# Patient Record
Sex: FEMALE | Race: WHITE | NOT HISPANIC OR LATINO | Employment: FULL TIME | URBAN - METROPOLITAN AREA
[De-identification: names, ages, dates, MRNs, and addresses within clinical notes are randomized per-mention and may not be internally consistent; named-entity substitution may affect disease eponyms.]

---

## 2017-02-01 ENCOUNTER — TRANSFERRED RECORDS (OUTPATIENT)
Dept: HEALTH INFORMATION MANAGEMENT | Facility: CLINIC | Age: 27
End: 2017-02-01

## 2017-02-01 LAB — PAP SMEAR - HIM PATIENT REPORTED: NEGATIVE

## 2017-11-27 ENCOUNTER — OFFICE VISIT (OUTPATIENT)
Dept: FAMILY MEDICINE | Facility: CLINIC | Age: 27
End: 2017-11-27
Payer: COMMERCIAL

## 2017-11-27 VITALS
HEIGHT: 69 IN | HEART RATE: 78 BPM | WEIGHT: 200 LBS | DIASTOLIC BLOOD PRESSURE: 60 MMHG | BODY MASS INDEX: 29.62 KG/M2 | OXYGEN SATURATION: 100 % | RESPIRATION RATE: 16 BRPM | TEMPERATURE: 96.7 F | SYSTOLIC BLOOD PRESSURE: 100 MMHG

## 2017-11-27 DIAGNOSIS — Z00.00 ROUTINE GENERAL MEDICAL EXAMINATION AT A HEALTH CARE FACILITY: Primary | ICD-10-CM

## 2017-11-27 DIAGNOSIS — J30.2 CHRONIC SEASONAL ALLERGIC RHINITIS DUE TO OTHER ALLERGEN: ICD-10-CM

## 2017-11-27 DIAGNOSIS — Z12.4 SCREENING FOR CERVICAL CANCER: ICD-10-CM

## 2017-11-27 DIAGNOSIS — Z30.09 FAMILY PLANNING: ICD-10-CM

## 2017-11-27 LAB
CHOLEST SERPL-MCNC: 167 MG/DL
GLUCOSE SERPL-MCNC: 98 MG/DL (ref 70–99)
HDLC SERPL-MCNC: 76 MG/DL
LDLC SERPL CALC-MCNC: 81 MG/DL
NONHDLC SERPL-MCNC: 91 MG/DL
TRIGL SERPL-MCNC: 50 MG/DL

## 2017-11-27 PROCEDURE — 99395 PREV VISIT EST AGE 18-39: CPT | Performed by: FAMILY MEDICINE

## 2017-11-27 PROCEDURE — 82947 ASSAY GLUCOSE BLOOD QUANT: CPT | Performed by: FAMILY MEDICINE

## 2017-11-27 PROCEDURE — 80061 LIPID PANEL: CPT | Performed by: FAMILY MEDICINE

## 2017-11-27 PROCEDURE — 36415 COLL VENOUS BLD VENIPUNCTURE: CPT | Performed by: FAMILY MEDICINE

## 2017-11-27 RX ORDER — LEVONORGESTREL / ETHINYL ESTRADIOL 0.15-0.03
1 KIT ORAL DAILY
Qty: 91 TABLET | Refills: 3 | Status: CANCELLED | OUTPATIENT
Start: 2017-11-27

## 2017-11-27 NOTE — PATIENT INSTRUCTIONS
[employer] [facility] [employer] phone number:    [phone]    Preventive Health Recommendations  Female Ages 26 - 39  Yearly exam:   See your health care provider every year in order to    Review health changes.     Discuss preventive care.      Review your medicines if you your doctor has prescribed any.    Until age 30: Get a Pap test every three years (more often if you have had an abnormal result).    After age 30: Talk to your doctor about whether you should have a Pap test every 3 years or have a Pap test with HPV screening every 5 years.   You do not need a Pap test if your uterus was removed (hysterectomy) and you have not had cancer.  You should be tested each year for STDs (sexually transmitted diseases), if you're at risk.   Talk to your provider about how often to have your cholesterol checked.  If you are at risk for diabetes, you should have a diabetes test (fasting glucose).  Shots: Get a flu shot each year. Get a tetanus shot every 10 years.   Nutrition:     Eat at least 5 servings of fruits and vegetables each day.    Eat whole-grain bread, whole-wheat pasta and brown rice instead of white grains and rice.    Talk to your provider about Calcium and Vitamin D.     Lifestyle    Exercise at least 150 minutes a week (30 minutes a day, 5 days of the week). This will help you control your weight and prevent disease.    Limit alcohol to one drink per day.    No smoking.     Wear sunscreen to prevent skin cancer.    See your dentist every six months for an exam and cleaning.

## 2017-11-27 NOTE — NURSING NOTE
"Chief Complaint   Patient presents with     Physical     initial /60 (BP Location: Left arm, Cuff Size: Adult Regular)  Pulse 78  Temp 96.7  F (35.9  C) (Oral)  Resp 16  Ht 5' 8.5\" (1.74 m)  Wt 200 lb (90.7 kg)  LMP 11/15/2017  SpO2 100%  BMI 29.97 kg/m2 Estimated body mass index is 29.97 kg/(m^2) as calculated from the following:    Height as of this encounter: 5' 8.5\" (1.74 m).    Weight as of this encounter: 200 lb (90.7 kg).  BP completed using cuff size: regular.  L  R arm      Health Maintenance that is potentially due pending provider review:  Pap done with ob/gyn    n/a    Ancelmo Santos ma  "
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
## 2017-11-27 NOTE — MR AVS SNAPSHOT
After Visit Summary   11/27/2017    Courtney Clarke    MRN: [id number]           Patient Information     Date Of Birth          1990        Visit Information        Provider Department      11/27/2017 8:30 AM Jenny Landrum MD [facility]        Today's Diagnoses     Routine general medical examination at a health care facility    -  1    Family planning        Screening for cervical cancer        Chronic seasonal allergic rhinitis due to other allergen        BMI 31.0-31.9,adult          Care Instructions    [employer] [facility] [employer] phone number:    [phone]    Preventive Health Recommendations  Female Ages 26 - 39  Yearly exam:   See your health care provider every year in order to    Review health changes.     Discuss preventive care.      Review your medicines if you your doctor has prescribed any.    Until age 30: Get a Pap test every three years (more often if you have had an abnormal result).    After age 30: Talk to your doctor about whether you should have a Pap test every 3 years or have a Pap test with HPV screening every 5 years.   You do not need a Pap test if your uterus was removed (hysterectomy) and you have not had cancer.  You should be tested each year for STDs (sexually transmitted diseases), if you're at risk.   Talk to your provider about how often to have your cholesterol checked.  If you are at risk for diabetes, you should have a diabetes test (fasting glucose).  Shots: Get a flu shot each year. Get a tetanus shot every 10 years.   Nutrition:     Eat at least 5 servings of fruits and vegetables each day.    Eat whole-grain bread, whole-wheat pasta and brown rice instead of white grains and rice.    Talk to your provider about Calcium and Vitamin D.     Lifestyle    Exercise at least 150 minutes a week (30 minutes a day, 5 days of the week). This will help you control your weight and prevent disease.    Limit alcohol to one drink per day.    No  "smoking.     Wear sunscreen to prevent skin cancer.    See your dentist every six months for an exam and cleaning.            Follow-ups after your visit        Follow-up notes from your care team     Return in about 1 year (around 11/27/2018).      Who to contact     If you have questions or need follow up information about today's clinic visit or your schedule please contact [facility] directly at [phone].  Normal or non-critical lab and imaging results will be communicated to you by Ge[employer], letter or phone within 4 business days after the clinic has received the results. If you do not hear from us within 7 days, please contact the clinic through "[employer] or phone. If you have a critical or abnormal lab result, we will notify you by phone as soon as possible.  Submit refill requests through "[employer] or call your pharmacy and they will forward the refill request to us. Please allow 3 business days for your refill to be completed.          Additional Information About Your Visit        [email or web address] Information     "[employer] gives you secure access to your electronic health record. If you see a primary care provider, you can also send messages to your care team and make appointments. If you have questions, please call your primary care clinic.  If you do not have a primary care provider, please call [phone] and they will assist you.        Care EveryWhere ID     This is your Care EveryWhere ID. This could be used by other organizations to access your [place] medical records  [id number]        Your Vitals Were     Pulse Temperature Respirations Height Last Period Pulse Oximetry    78 96.7  F (35.9  C) (Oral) 16 5' 8.5\" (1.74 m) 11/15/2017 100%    BMI (Body Mass Index)                   29.97 kg/m2            Blood Pressure from Last 3 Encounters:   11/27/17 100/60   11/21/16 108/68   11/20/15 110/68    Weight from Last 3 Encounters:   11/27/17 200 lb (90.7 kg)   11/21/16 203 lb 11.2 oz (92.4 kg) "   11/20/15 205 lb 12.8 oz (93.4 kg)              We Performed the Following     Glucose     Lipid panel reflex to direct LDL Fasting        Primary Care Provider Office Phone # Fax #    Jenny Landrum [phone] [address] [facility] [address]        Equal Access to Services     [facility]: Hadii aad ku hadasho Soomaali, waaxda luqadaha, qaybta kaalmada adeegyada, waxay idiin hayaan adeeg kharash la'aan . So [facility] [phone].    ATENCIÓN: Si habla español, tiene a faulkner disposición servicios gratuitos de asistencia lingüística. Llame al [phone].    We comply with applicable federal civil rights laws and [place] laws. We do not discriminate on the basis of race, color, national origin, age, disability, sex, sexual orientation, or gender identity.            Thank you!     Thank you for choosing [facility]  for your care. Our goal is always to provide you with excellent care. Hearing back from our patients is one way we can continue to improve our services. Please take a few minutes to complete the written survey that you may receive in the mail after your visit with us. Thank you!             Your Updated Medication List - Protect others around you: Learn how to safely use, store and throw away your medicines at www.disposemymeds.org.          This list is accurate as of: 11/27/17  8:47 AM.  Always use your most recent med list.                   Brand Name Dispense Instructions for use Diagnosis    cetirizine 10 MG tablet    zyrTEC     Take 10 mg by mouth daily

## 2017-11-28 NOTE — PROGRESS NOTES
-Cholesterol levels (LDL,HDL, Triglycerides) are normal.  ADVISE: rechecking in 1 year.  -Glucose (diabetic screening test) is normal.    Please keep us posted with questions or concerns .      Best Regards,    Jenny Landrum MD  [facility]  [phone]

## 2018-11-29 ENCOUNTER — OFFICE VISIT (OUTPATIENT)
Dept: FAMILY MEDICINE | Facility: CLINIC | Age: 28
End: 2018-11-29
Payer: COMMERCIAL

## 2018-11-29 ENCOUNTER — TELEPHONE (OUTPATIENT)
Dept: FAMILY MEDICINE | Facility: CLINIC | Age: 28
End: 2018-11-29

## 2018-11-29 VITALS
OXYGEN SATURATION: 100 % | HEART RATE: 64 BPM | DIASTOLIC BLOOD PRESSURE: 72 MMHG | WEIGHT: 203.6 LBS | TEMPERATURE: 98.6 F | HEIGHT: 69 IN | BODY MASS INDEX: 30.16 KG/M2 | SYSTOLIC BLOOD PRESSURE: 110 MMHG

## 2018-11-29 DIAGNOSIS — Z11.4 SCREENING FOR HIV (HUMAN IMMUNODEFICIENCY VIRUS): ICD-10-CM

## 2018-11-29 DIAGNOSIS — Z30.41 ENCOUNTER FOR SURVEILLANCE OF CONTRACEPTIVE PILLS: ICD-10-CM

## 2018-11-29 DIAGNOSIS — Z00.00 PREVENTATIVE HEALTH CARE: Primary | ICD-10-CM

## 2018-11-29 LAB
CHOLEST SERPL-MCNC: 152 MG/DL
GLUCOSE SERPL-MCNC: 94 MG/DL (ref 70–99)
HDLC SERPL-MCNC: 62 MG/DL
HIV 1+2 AB+HIV1 P24 AG SERPL QL IA: NONREACTIVE
LDLC SERPL CALC-MCNC: 77 MG/DL
NONHDLC SERPL-MCNC: 90 MG/DL
TRIGL SERPL-MCNC: 63 MG/DL

## 2018-11-29 PROCEDURE — 80061 LIPID PANEL: CPT | Performed by: FAMILY MEDICINE

## 2018-11-29 PROCEDURE — 36415 COLL VENOUS BLD VENIPUNCTURE: CPT | Performed by: FAMILY MEDICINE

## 2018-11-29 PROCEDURE — 82947 ASSAY GLUCOSE BLOOD QUANT: CPT | Performed by: FAMILY MEDICINE

## 2018-11-29 PROCEDURE — 99395 PREV VISIT EST AGE 18-39: CPT | Performed by: FAMILY MEDICINE

## 2018-11-29 PROCEDURE — 87389 HIV-1 AG W/HIV-1&-2 AB AG IA: CPT | Performed by: FAMILY MEDICINE

## 2018-11-29 RX ORDER — LEVONORGESTREL AND ETHINYL ESTRADIOL 0.15-0.03
KIT ORAL
Refills: 1 | COMMUNITY
Start: 2018-11-10 | End: 2020-01-24

## 2018-11-29 NOTE — MR AVS SNAPSHOT
After Visit Summary   11/29/2018    Courtnye Clarke    MRN: [id number]           Patient Information     Date Of Birth          1990        Visit Information        Provider Department      11/29/2018 7:40 AM Tahir Toussaint MD [facility]        Today's Diagnoses     Preventative health care    -  1      Care Instructions      Preventive Health Recommendations  Female Ages 26 - 39  Yearly exam:   See your health care provider every year in order to    Review health changes.     Discuss preventive care.      Review your medicines if you your doctor has prescribed any.    Until age 30: Get a Pap test every three years (more often if you have had an abnormal result).    After age 30: Talk to your doctor about whether you should have a Pap test every 3 years or have a Pap test with HPV screening every 5 years.   You do not need a Pap test if your uterus was removed (hysterectomy) and you have not had cancer.  You should be tested each year for STDs (sexually transmitted diseases), if you're at risk.   Talk to your provider about how often to have your cholesterol checked.  If you are at risk for diabetes, you should have a diabetes test (fasting glucose).  Shots: Get a flu shot each year. Get a tetanus shot every 10 years.   Nutrition:     Eat at least 5 servings of fruits and vegetables each day.    Eat whole-grain bread, whole-wheat pasta and brown rice instead of white grains and rice.    Get adequate Calcium and Vitamin D.     Lifestyle    Exercise at least 150 minutes a week (30 minutes a day, 5 days of the week). This will help you control your weight and prevent disease.    Limit alcohol to one drink per day.    No smoking.     Wear sunscreen to prevent skin cancer.    See your dentist every six months for an exam and cleaning.            Follow-ups after your visit        Follow-up notes from your care team     Return in about 1 year (around 11/29/2019).      Who to contact   "   If you have questions or need follow up information about today's clinic visit or your schedule please contact [facility] directly at [phone].  Normal or non-critical lab and imaging results will be communicated to you by MyChart, letter or phone within 4 business days after the clinic has received the results. If you do not hear from us within 7 days, please contact the clinic through [employer] or phone. If you have a critical or abnormal lab result, we will notify you by phone as soon as possible.  Submit refill requests through [employer] or call your pharmacy and they will forward the refill request to us. Please allow 3 business days for your refill to be completed.          Additional Information About Your Visit        [employer] Information     [employer] gives you secure access to your electronic health record. If you see a primary care provider, you can also send messages to your care team and make appointments. If you have questions, please call your primary care clinic.  If you do not have a primary care provider, please call [phone] and they will assist you.        Care EveryWhere ID     This is your Care EveryWhere ID. This could be used by other organizations to access your [place] medical records  [id number]        Your Vitals Were     Pulse Temperature Height Last Period Pulse Oximetry BMI (Body Mass Index)    64 98.6  F (37  C) (Tympanic) 5' 8.5\" (1.74 m) 11/27/2018 100% 30.51 kg/m2       Blood Pressure from Last 3 Encounters:   11/29/18 110/72   11/27/17 100/60   11/21/16 108/68    Weight from Last 3 Encounters:   11/29/18 203 lb 9.6 oz (92.4 kg)   11/27/17 200 lb (90.7 kg)   11/21/16 203 lb 11.2 oz (92.4 kg)              We Performed the Following     Glucose     HIV Antigen Antibody Combo     Lipid panel reflex to direct LDL Fasting        Primary Care Provider Office Phone # Fax #    Jenny Landrum [phone] [address] [facility] " [id number]        Equal Access to Services     [facility]: Hadii aad ku hadmansimelba Cristelaali, wabinduwesley piperzahraaha, qaulyssesalfredo zhenglindasheba austin. So [facility] [phone].    ATENCIÓN: Si habla español, tiene a faulkner disposición servicios gratuitos de asistencia lingüística. Llame al [phone].    We comply with applicable federal civil rights laws and [place] laws. We do not discriminate on the basis of race, color, national origin, age, disability, sex, sexual orientation, or gender identity.            Thank you!     Thank you for choosing [facility]  for your care. Our goal is always to provide you with excellent care. Hearing back from our patients is one way we can continue to improve our services. Please take a few minutes to complete the written survey that you may receive in the mail after your visit with us. Thank you!             Your Updated Medication List - Protect others around you: Learn how to safely use, store and throw away your medicines at www.disposemymeds.org.          This list is accurate as of 11/29/18  8:12 AM.  Always use your most recent med list.                   Brand Name Dispense Instructions for use Diagnosis    cetirizine 10 MG tablet    zyrTEC     Take 10 mg by mouth daily        INTROVALE 0.15-0.03 MG tablet   Generic drug:  levonorgestrel-ethinyl estradiol       [facility] health care

## 2018-11-29 NOTE — PROGRESS NOTES
SUBJECTIVE:   CC: Courtney Clarke is an 28 year old woman who presents for preventive health visit.     Physical   Annual:     Getting at least 3 servings of Calcium per day:  Yes    Bi-annual eye exam:  Yes    Dental care twice a year:  Yes    Sleep apnea or symptoms of sleep apnea:  None    Diet:  Regular (no restrictions)    Frequency of exercise:  2-3 days/week    Duration of exercise:  45-60 minutes    Taking medications regularly:  Yes    Medication side effects:  Not applicable    Additional concerns today:  No    PHQ-2 Total Score: 0      Denies any concerns.   Using an OCP for contraception.     Today's PHQ-2 Score:   PHQ-2 ( 1999 Pfizer) 11/29/2018   Q1: Little interest or pleasure in doing things 0   Q2: Feeling down, depressed or hopeless 0   PHQ-2 Score 0   Q1: Little interest or pleasure in doing things Not at all   Q2: Feeling down, depressed or hopeless Not at all   PHQ-2 Score 0       Abuse: Current or Past(Physical, Sexual or Emotional)- NO  Do you feel safe in your environment? YES    Social History   Substance Use Topics     Smoking status: Never Smoker     Smokeless tobacco: Never Used     Alcohol use 0.0 oz/week     0 Standard drinks or equivalent per week      Comment: 1-2 a few times per week     Alcohol Use 11/29/2018   If you drink alcohol do you typically have greater than 3 drinks per day OR greater than 7 drinks per week? No   No flowsheet data found.    Reviewed orders with patient.  Reviewed health maintenance and updated orders accordingly - Yes  labs reviewed in EPIC    Mammogram not appropriate for this patient based on age.    Pertinent mammograms are reviewed under the imaging tab.  History of abnormal Pap smear: NO - age 30-65 PAP every 5 years with negative HPV co-testing recommended     Reviewed and updated as needed this visit by clinical staff  Tobacco  Allergies  Meds  Med Hx  Surg Hx  Fam Hx  Soc Hx        Reviewed and updated as needed this visit by Provider       "      Review of Systems  CONSTITUTIONAL: NEGATIVE for fever, chills, change in weight  INTEGUMENTARU/SKIN: NEGATIVE for worrisome rashes, moles or lesions  EYES: NEGATIVE for vision changes or irritation  ENT: NEGATIVE for ear, mouth and throat problems  RESP: NEGATIVE for significant cough or SOB  BREAST: NEGATIVE for masses, tenderness or discharge  CV: NEGATIVE for chest pain, palpitations or peripheral edema  GI: NEGATIVE for nausea, abdominal pain, heartburn, or change in bowel habits  : NEGATIVE for unusual urinary or vaginal symptoms. Periods are regular.  MUSCULOSKELETAL: NEGATIVE for significant arthralgias or myalgia  NEURO: NEGATIVE for weakness, dizziness or paresthesias  PSYCHIATRIC: NEGATIVE for changes in mood or affect     OBJECTIVE:   /72  Pulse 64  Temp 98.6  F (37  C) (Tympanic)  Ht 5' 8.5\" (1.74 m)  Wt 203 lb 9.6 oz (92.4 kg)  LMP 11/27/2018  SpO2 100%  BMI 30.51 kg/m2  Physical Exam  GENERAL: healthy, alert and no distress  EYES: Eyes grossly normal to inspection, PERRL and conjunctivae and sclerae normal  HENT: ear canals and TM's normal, nose and mouth without ulcers or lesions  NECK: no adenopathy, no asymmetry, masses, or scars and thyroid normal to palpation  RESP: lungs clear to auscultation - no rales, rhonchi or wheezes  BREAST: normal without masses, tenderness or nipple discharge and no palpable axillary masses or adenopathy  CV: regular rate and rhythm, normal S1 S2, no S3 or S4, no murmur, click or rub, no peripheral edema and peripheral pulses strong  ABDOMEN: soft, nontender, no hepatosplenomegaly, no masses and bowel sounds normal  MS: no gross musculoskeletal defects noted, no edema  SKIN: no suspicious lesions or rashes  NEURO: Normal strength and tone, mentation intact and speech normal  PSYCH: mentation appears normal, affect normal/bright    Diagnostic Test Results:  No results found for this or any previous visit (from the past 24 hour(s)).    ASSESSMENT/PLAN: " "      ICD-10-CM    1. Preventative health care Z00.00 Lipid panel reflex to direct LDL Fasting     Glucose   2. Encounter for surveillance of contraceptive pills Z30.41 INTROVALE 0.15-0.03 MG tablet   3. Screening for HIV (human immunodeficiency virus) Z11.4 HIV Antigen Antibody Combo       COUNSELING:  Reviewed preventive health counseling, as reflected in patient instructions       Regular exercise       Healthy diet/nutrition    BP Readings from Last 1 Encounters:   11/29/18 110/72     Estimated body mass index is 30.51 kg/(m^2) as calculated from the following:    Height as of this encounter: 5' 8.5\" (1.74 m).    Weight as of this encounter: 203 lb 9.6 oz (92.4 kg).    Counseling Resources:  ATP IV Guidelines  Pooled Cohorts Equation Calculator  Breast Cancer Risk Calculator  FRAX Risk Assessment  ICSI Preventive Guidelines  Dietary Guidelines for Americans, 2010  USDA's MyPlate  ASA Prophylaxis  Lung CA Screening    Tahir Toussaint MD  [facility]  "

## 2018-11-29 NOTE — NURSING NOTE
"Chief Complaint   Patient presents with     Physical     pt is fasting, has HEALTH screening form     /72  Pulse 64  Temp 98.6  F (37  C) (Tympanic)  Ht 5' 8.5\" (1.74 m)  Wt 203 lb 9.6 oz (92.4 kg)  LMP 11/27/2018  SpO2 100%  BMI 30.51 kg/m2 Estimated body mass index is 30.51 kg/(m^2) as calculated from the following:    Height as of this encounter: 5' 8.5\" (1.74 m).    Weight as of this encounter: 203 lb 9.6 oz (92.4 kg).        Health Maintenance due pending provider review:  NONE    n/a    Shannon Aden CMA  "

## 2018-12-06 NOTE — PROGRESS NOTES
Dear Courtney,   Here are your recent results.  All results are within normal ranges.    Tahir Toussaint MD

## 2019-02-01 ENCOUNTER — TRANSFERRED RECORDS (OUTPATIENT)
Dept: HEALTH INFORMATION MANAGEMENT | Facility: CLINIC | Age: 29
End: 2019-02-01

## 2019-02-01 LAB — PAP SMEAR - HIM PATIENT REPORTED: NEGATIVE

## 2019-12-09 ENCOUNTER — HEALTH MAINTENANCE LETTER (OUTPATIENT)
Age: 29
End: 2019-12-09

## 2020-01-24 ENCOUNTER — OFFICE VISIT (OUTPATIENT)
Dept: FAMILY MEDICINE | Facility: CLINIC | Age: 30
End: 2020-01-24
Payer: COMMERCIAL

## 2020-01-24 VITALS
OXYGEN SATURATION: 99 % | HEIGHT: 68 IN | HEART RATE: 69 BPM | DIASTOLIC BLOOD PRESSURE: 66 MMHG | TEMPERATURE: 98.5 F | RESPIRATION RATE: 16 BRPM | SYSTOLIC BLOOD PRESSURE: 95 MMHG | BODY MASS INDEX: 29.7 KG/M2 | WEIGHT: 196 LBS

## 2020-01-24 DIAGNOSIS — Z00.00 ROUTINE GENERAL MEDICAL EXAMINATION AT A HEALTH CARE FACILITY: Primary | ICD-10-CM

## 2020-01-24 PROCEDURE — 99395 PREV VISIT EST AGE 18-39: CPT | Performed by: FAMILY MEDICINE

## 2020-01-24 ASSESSMENT — MIFFLIN-ST. JEOR: SCORE: 1652.55

## 2020-01-24 NOTE — PROGRESS NOTES
SUBJECTIVE:   CC: Courtney Clarke is an 29 year old woman who presents for preventive health visit.     Healthy Habits:     Getting at least 3 servings of Calcium per day:  Yes    Bi-annual eye exam:  Yes    Dental care twice a year:  Yes    Sleep apnea or symptoms of sleep apnea:  None    Diet:  Regular (no restrictions)    Frequency of exercise:  4-5 days/week    Duration of exercise:  30-45 minutes    Taking medications regularly:  Yes    Medication side effects:  Not applicable    PHQ-2 Total Score: 0    Additional concerns today:  No    Pap done at [facility]. Last pap was 3 yrs ago and it was normal. Due for a pap in 02/2022    Today's PHQ-2 Score:   PHQ-2 ( 1999 [employer]) 1/24/2020   Q1: Little interest or pleasure in doing things 0   Q2: Feeling down, depressed or hopeless 0   PHQ-2 Score 0   Q1: Little interest or pleasure in doing things Not at all   Q2: Feeling down, depressed or hopeless Not at all   PHQ-2 Score 0       Abuse: Current or Past(Physical, Sexual or Emotional)- No  Do you feel safe in your environment? Yes        Social History     Tobacco Use     Smoking status: Never Smoker     Smokeless tobacco: Never Used   Substance Use Topics     Alcohol use: Yes     Alcohol/week: 0.0 standard drinks     Comment: 1-2 a few times per week     If you drink alcohol do you typically have >3 drinks per day or >7 drinks per week? No    Alcohol Use 1/24/2020   Prescreen: >3 drinks/day or >7 drinks/week? No   Prescreen: >3 drinks/day or >7 drinks/week? -   No flowsheet data found.    Reviewed orders with patient.  Reviewed health maintenance and updated orders accordingly - Yes  Lab work is in process    Mammogram not appropriate for this patient based on age.    Pertinent mammograms are reviewed under the imaging tab.  History of abnormal Pap smear: NO - age 30- 65 PAP every 3 years recommended     Reviewed and updated as needed this visit by clinical staff  Tobacco  Allergies  Meds  Med Hx   "Surg Hx  Fam Hx  Soc Hx        Reviewed and updated as needed this visit by Provider            Review of Systems  CONSTITUTIONAL: NEGATIVE for fever, chills, change in weight  INTEGUMENTARU/SKIN: NEGATIVE for worrisome rashes, moles or lesions  EYES: NEGATIVE for vision changes or irritation  ENT: NEGATIVE for ear, mouth and throat problems  RESP: NEGATIVE for significant cough or SOB  BREAST: NEGATIVE for masses, tenderness or discharge  CV: NEGATIVE for chest pain, palpitations or peripheral edema  GI: NEGATIVE for nausea, abdominal pain, heartburn, or change in bowel habits  : NEGATIVE for unusual urinary or vaginal symptoms. Periods are regular.  MUSCULOSKELETAL: NEGATIVE for significant arthralgias or myalgia  NEURO: NEGATIVE for weakness, dizziness or paresthesias  PSYCHIATRIC: NEGATIVE for changes in mood or affect     OBJECTIVE:   BP 95/66   Pulse 69   Temp 98.5  F (36.9  C) (Oral)   Resp 16   Ht 1.727 m (5' 8\")   Wt 88.9 kg (196 lb)   LMP 01/11/2020   SpO2 99%   BMI 29.80 kg/m    Physical Exam  GENERAL: healthy, alert and no distress  EYES: Eyes grossly normal to inspection, PERRL and conjunctivae and sclerae normal  HENT: ear canals and TM's normal, nose and mouth without ulcers or lesions  NECK: no adenopathy, no asymmetry, masses, or scars and thyroid normal to palpation  RESP: lungs clear to auscultation - no rales, rhonchi or wheezes  BREAST: normal without masses, tenderness or nipple discharge and no palpable axillary masses or adenopathy  CV: regular rate and rhythm, normal S1 S2, no S3 or S4, no murmur, click or rub, no peripheral edema and peripheral pulses strong  ABDOMEN: soft, nontender, no hepatosplenomegaly, no masses and bowel sounds normal  MS: no gross musculoskeletal defects noted, no edema  SKIN: no suspicious lesions or rashes  NEURO: Normal strength and tone, mentation intact and speech normal  PSYCH: mentation appears normal, affect normal/bright    Diagnostic Test " "Results:  Labs reviewed in Epic  No results found for this or any previous visit (from the past 24 hour(s)).    ASSESSMENT/PLAN:       ICD-10-CM    1. Routine general medical examination at a health care facility Z00.00        COUNSELING:  Reviewed preventive health counseling, as reflected in patient instructions       Regular exercise       Healthy diet/nutrition       Vision screening       Hearing screening    Estimated body mass index is 29.8 kg/m  as calculated from the following:    Height as of this encounter: 1.727 m (5' 8\").    Weight as of this encounter: 88.9 kg (196 lb).    Counseling Resources:  ATP IV Guidelines  Pooled Cohorts Equation Calculator  Breast Cancer Risk Calculator  FRAX Risk Assessment  ICSI Preventive Guidelines  Dietary Guidelines for Americans, 2010  USDA's MyPlate  ASA Prophylaxis  Lung CA Screening    Tahir Toussaint MD  [facility]  "

## 2020-04-02 LAB
ABO + RH BLD: NORMAL
ABO + RH BLD: NORMAL
BLD GP AB SCN SERPL QL: NEGATIVE
HBV SURFACE AG SERPL QL IA: NEGATIVE
HIV 1+2 AB+HIV1 P24 AG SERPL QL IA: NORMAL
RUBELLA ABY IGG: NORMAL
TREPONEMA ANTIBODIES: NORMAL

## 2020-10-14 ENCOUNTER — TELEPHONE (OUTPATIENT)
Dept: FAMILY MEDICINE | Facility: CLINIC | Age: 30
End: 2020-10-14

## 2020-10-16 LAB — GROUP B STREP PCR: NEGATIVE

## 2020-10-20 ENCOUNTER — HOSPITAL ENCOUNTER (OUTPATIENT)
Facility: CLINIC | Age: 30
Discharge: HOME | End: 2020-10-20
Attending: OBSTETRICS & GYNECOLOGY | Admitting: OBSTETRICS & GYNECOLOGY
Payer: COMMERCIAL

## 2020-10-20 VITALS — DIASTOLIC BLOOD PRESSURE: 71 MMHG | RESPIRATION RATE: 18 BRPM | TEMPERATURE: 98.4 F | SYSTOLIC BLOOD PRESSURE: 120 MMHG

## 2020-10-20 PROBLEM — O26.90 PREGNANCY RELATED CONDITION: Status: ACTIVE | Noted: 2020-10-20

## 2020-10-20 LAB — RUPTURE OF FETAL MEMBRANES BY ROM PLUS: NEGATIVE

## 2020-10-20 PROCEDURE — 59025 FETAL NON-STRESS TEST: CPT

## 2020-10-20 PROCEDURE — 84112 EVAL AMNIOTIC FLUID PROTEIN: CPT | Performed by: OBSTETRICS & GYNECOLOGY

## 2020-10-20 PROCEDURE — G0463 HOSPITAL OUTPT CLINIC VISIT: HCPCS

## 2020-10-20 RX ORDER — PRENATAL VIT/IRON FUM/FOLIC AC 27MG-0.8MG
1 TABLET ORAL DAILY
COMMUNITY

## 2020-10-20 RX ORDER — ONDANSETRON 2 MG/ML
4 INJECTION INTRAMUSCULAR; INTRAVENOUS EVERY 6 HOURS PRN
Status: DISCONTINUED | OUTPATIENT
Start: 2020-10-20 | End: 2020-10-20 | Stop reason: HOSPADM

## 2020-10-20 NOTE — PLAN OF CARE
Primip admitted to MAC, ambulatory per services of Dr. Schwab for evaluation of PROM.  Pt states she has noticed leaking of watery discharge since yesterday.  Reports good fetal movement, denies any contractions, no bleeding.  Discussed plan of care including EFM with NST, routine VS, ROM+  and possible SVE.  Pt agreeable.  EFM applied and admission assessment completed.

## 2020-10-20 NOTE — DISCHARGE INSTRUCTIONS
Discharge Instruction for Undelivered Patients      You were seen for: Membrane Assessment  We Consulted: Dr. Schwab  You had (Test or Medicine):External fetal monitoring with non-stress test, routine vital signs and a ROM+ test     Diet:   Drink 8 to 12 glasses of liquids (milk, juice, water) every day.  You may eat meals and snacks.     Activity:  Call your doctor or nurse midwife if your baby is moving less than usual.     Call your provider if you notice:  Swelling in your face or increased swelling in your hands or legs.  Headaches that are not relieved by Tylenol (acetaminophen).  Changes in your vision (blurring: seeing spots or stars.)  Nausea (sick to your stomach) and vomiting (throwing up).   Weight gain of 5 pounds or more per week.  Heartburn that doesn't go away.  Signs of bladder infection: pain when you urinate (use the toilet), need to go more often and more urgently.  The bag of bhatti (rupture of membranes) breaks, or you notice leaking in your underwear.  Bright red blood in your underwear.  Abdominal (lower belly) or stomach pain.  For first baby: Contractions (tightening) less than 5 minutes apart for one hour or more.  Increase or change in vaginal discharge (note the color and amount)  Other: Call for any questions or concerns    Follow-up:  As scheduled in the clinic

## 2020-10-20 NOTE — PLAN OF CARE
1715- Dr. Schwab updated. Plan discharge to home with labor precautions.  All discharge teaching completed with patient.

## 2020-10-24 ENCOUNTER — ANESTHESIA EVENT (OUTPATIENT)
Dept: OBGYN | Facility: CLINIC | Age: 30
End: 2020-10-24
Payer: COMMERCIAL

## 2020-10-24 ENCOUNTER — HOSPITAL ENCOUNTER (INPATIENT)
Facility: CLINIC | Age: 30
LOS: 2 days | Discharge: HOME | End: 2020-10-26
Attending: OBSTETRICS & GYNECOLOGY | Admitting: OBSTETRICS & GYNECOLOGY
Payer: COMMERCIAL

## 2020-10-24 ENCOUNTER — ANESTHESIA (OUTPATIENT)
Dept: OBGYN | Facility: CLINIC | Age: 30
End: 2020-10-24
Payer: COMMERCIAL

## 2020-10-24 LAB
ABO + RH BLD: NORMAL
ABO + RH BLD: NORMAL
BASOPHILS # BLD AUTO: 0 10E9/L (ref 0–0.2)
BASOPHILS NFR BLD AUTO: 0.2 %
BLD GP AB SCN SERPL QL: NORMAL
BLOOD BANK CMNT PATIENT-IMP: NORMAL
DIFFERENTIAL METHOD BLD: ABNORMAL
EOSINOPHIL # BLD AUTO: 0.1 10E9/L (ref 0–0.7)
EOSINOPHIL NFR BLD AUTO: 0.4 %
ERYTHROCYTE [DISTWIDTH] IN BLOOD BY AUTOMATED COUNT: 13.2 % (ref 10–15)
HCT VFR BLD AUTO: 38.1 % (ref 35–47)
HGB BLD-MCNC: 13.3 G/DL (ref 11.7–15.7)
IMM GRANULOCYTES # BLD: 0.1 10E9/L (ref 0–0.4)
IMM GRANULOCYTES NFR BLD: 0.5 %
LYMPHOCYTES # BLD AUTO: 1.2 10E9/L (ref 0.8–5.3)
LYMPHOCYTES NFR BLD AUTO: 10.3 %
MCH RBC QN AUTO: 33.4 PG (ref 26.5–33)
MCHC RBC AUTO-ENTMCNC: 34.9 G/DL (ref 31.5–36.5)
MCV RBC AUTO: 96 FL (ref 78–100)
MONOCYTES # BLD AUTO: 0.8 10E9/L (ref 0–1.3)
MONOCYTES NFR BLD AUTO: 6.9 %
NEUTROPHILS # BLD AUTO: 9.7 10E9/L (ref 1.6–8.3)
NEUTROPHILS NFR BLD AUTO: 81.7 %
NRBC # BLD AUTO: 0 10*3/UL
NRBC BLD AUTO-RTO: 0 /100
PLATELET # BLD AUTO: 292 10E9/L (ref 150–450)
RBC # BLD AUTO: 3.98 10E12/L (ref 3.8–5.2)
SPECIMEN EXP DATE BLD: NORMAL
WBC # BLD AUTO: 11.9 10E9/L (ref 4–11)

## 2020-10-24 PROCEDURE — 370N000003 HC ANESTHESIA WARD SERVICE

## 2020-10-24 PROCEDURE — 86780 TREPONEMA PALLIDUM: CPT | Performed by: OBSTETRICS & GYNECOLOGY

## 2020-10-24 PROCEDURE — 258N000003 HC RX IP 258 OP 636: Performed by: ANESTHESIOLOGY

## 2020-10-24 PROCEDURE — G0463 HOSPITAL OUTPT CLINIC VISIT: HCPCS

## 2020-10-24 PROCEDURE — 120N000001 HC R&B MED SURG/OB

## 2020-10-24 PROCEDURE — 86850 RBC ANTIBODY SCREEN: CPT | Performed by: OBSTETRICS & GYNECOLOGY

## 2020-10-24 PROCEDURE — 258N000003 HC RX IP 258 OP 636: Performed by: OBSTETRICS & GYNECOLOGY

## 2020-10-24 PROCEDURE — 36415 COLL VENOUS BLD VENIPUNCTURE: CPT | Performed by: OBSTETRICS & GYNECOLOGY

## 2020-10-24 PROCEDURE — 86900 BLOOD TYPING SEROLOGIC ABO: CPT | Performed by: OBSTETRICS & GYNECOLOGY

## 2020-10-24 PROCEDURE — 85025 COMPLETE CBC W/AUTO DIFF WBC: CPT | Performed by: OBSTETRICS & GYNECOLOGY

## 2020-10-24 PROCEDURE — 0DQR0ZZ REPAIR ANAL SPHINCTER, OPEN APPROACH: ICD-10-PCS | Performed by: OBSTETRICS & GYNECOLOGY

## 2020-10-24 PROCEDURE — 250N000011 HC RX IP 250 OP 636: Performed by: ANESTHESIOLOGY

## 2020-10-24 PROCEDURE — 86901 BLOOD TYPING SEROLOGIC RH(D): CPT | Performed by: OBSTETRICS & GYNECOLOGY

## 2020-10-24 PROCEDURE — 722N000001 HC LABOR CARE VAGINAL DELIVERY SINGLE

## 2020-10-24 PROCEDURE — 250N000009 HC RX 250: Performed by: ANESTHESIOLOGY

## 2020-10-24 PROCEDURE — 250N000009 HC RX 250: Performed by: OBSTETRICS & GYNECOLOGY

## 2020-10-24 RX ORDER — TRANEXAMIC ACID 10 MG/ML
1 INJECTION, SOLUTION INTRAVENOUS EVERY 30 MIN PRN
Status: DISCONTINUED | OUTPATIENT
Start: 2020-10-24 | End: 2020-10-26 | Stop reason: HOSPADM

## 2020-10-24 RX ORDER — NALOXONE HYDROCHLORIDE 0.4 MG/ML
.1-.4 INJECTION, SOLUTION INTRAMUSCULAR; INTRAVENOUS; SUBCUTANEOUS
Status: DISCONTINUED | OUTPATIENT
Start: 2020-10-24 | End: 2020-10-26 | Stop reason: HOSPADM

## 2020-10-24 RX ORDER — NALOXONE HYDROCHLORIDE 0.4 MG/ML
.1-.4 INJECTION, SOLUTION INTRAMUSCULAR; INTRAVENOUS; SUBCUTANEOUS
Status: DISCONTINUED | OUTPATIENT
Start: 2020-10-24 | End: 2020-10-25 | Stop reason: CLARIF

## 2020-10-24 RX ORDER — ONDANSETRON 2 MG/ML
4 INJECTION INTRAMUSCULAR; INTRAVENOUS EVERY 6 HOURS PRN
Status: DISCONTINUED | OUTPATIENT
Start: 2020-10-24 | End: 2020-10-25 | Stop reason: CLARIF

## 2020-10-24 RX ORDER — METHYLERGONOVINE MALEATE 0.2 MG/ML
200 INJECTION INTRAVENOUS
Status: DISCONTINUED | OUTPATIENT
Start: 2020-10-24 | End: 2020-10-25 | Stop reason: CLARIF

## 2020-10-24 RX ORDER — BISACODYL 10 MG
10 SUPPOSITORY, RECTAL RECTAL DAILY PRN
Status: DISCONTINUED | OUTPATIENT
Start: 2020-10-26 | End: 2020-10-26 | Stop reason: HOSPADM

## 2020-10-24 RX ORDER — OXYTOCIN/0.9 % SODIUM CHLORIDE 30/500 ML
100-340 PLASTIC BAG, INJECTION (ML) INTRAVENOUS CONTINUOUS PRN
Status: COMPLETED | OUTPATIENT
Start: 2020-10-24 | End: 2020-10-24

## 2020-10-24 RX ORDER — CARBOPROST TROMETHAMINE 250 UG/ML
250 INJECTION, SOLUTION INTRAMUSCULAR
Status: DISCONTINUED | OUTPATIENT
Start: 2020-10-24 | End: 2020-10-25 | Stop reason: CLARIF

## 2020-10-24 RX ORDER — OXYTOCIN/0.9 % SODIUM CHLORIDE 30/500 ML
340 PLASTIC BAG, INJECTION (ML) INTRAVENOUS CONTINUOUS PRN
Status: DISCONTINUED | OUTPATIENT
Start: 2020-10-24 | End: 2020-10-26 | Stop reason: HOSPADM

## 2020-10-24 RX ORDER — ACETAMINOPHEN 325 MG/1
650 TABLET ORAL EVERY 4 HOURS PRN
Status: DISCONTINUED | OUTPATIENT
Start: 2020-10-24 | End: 2020-10-25 | Stop reason: CLARIF

## 2020-10-24 RX ORDER — FENTANYL CITRATE 50 UG/ML
50-100 INJECTION, SOLUTION INTRAMUSCULAR; INTRAVENOUS
Status: DISCONTINUED | OUTPATIENT
Start: 2020-10-24 | End: 2020-10-25 | Stop reason: CLARIF

## 2020-10-24 RX ORDER — SODIUM CHLORIDE, SODIUM LACTATE, POTASSIUM CHLORIDE, CALCIUM CHLORIDE 600; 310; 30; 20 MG/100ML; MG/100ML; MG/100ML; MG/100ML
INJECTION, SOLUTION INTRAVENOUS CONTINUOUS
Status: DISCONTINUED | OUTPATIENT
Start: 2020-10-24 | End: 2020-10-25 | Stop reason: CLARIF

## 2020-10-24 RX ORDER — ONDANSETRON 4 MG/1
4 TABLET, ORALLY DISINTEGRATING ORAL EVERY 6 HOURS PRN
Status: DISCONTINUED | OUTPATIENT
Start: 2020-10-24 | End: 2020-10-25 | Stop reason: CLARIF

## 2020-10-24 RX ORDER — IBUPROFEN 400 MG/1
800 TABLET, FILM COATED ORAL
Status: COMPLETED | OUTPATIENT
Start: 2020-10-24 | End: 2020-10-25

## 2020-10-24 RX ORDER — ACETAMINOPHEN 325 MG/1
650 TABLET ORAL EVERY 4 HOURS PRN
Status: DISCONTINUED | OUTPATIENT
Start: 2020-10-24 | End: 2020-10-26 | Stop reason: HOSPADM

## 2020-10-24 RX ORDER — EPHEDRINE SULFATE 50 MG/ML
5 INJECTION, SOLUTION INTRAMUSCULAR; INTRAVENOUS; SUBCUTANEOUS
Status: DISCONTINUED | OUTPATIENT
Start: 2020-10-24 | End: 2020-10-25 | Stop reason: CLARIF

## 2020-10-24 RX ORDER — DOCUSATE SODIUM 100 MG/1
100 CAPSULE, LIQUID FILLED ORAL 2 TIMES DAILY
Status: DISCONTINUED | OUTPATIENT
Start: 2020-10-24 | End: 2020-10-26 | Stop reason: HOSPADM

## 2020-10-24 RX ORDER — IBUPROFEN 600 MG/1
600 TABLET, FILM COATED ORAL EVERY 6 HOURS PRN
Status: DISCONTINUED | OUTPATIENT
Start: 2020-10-24 | End: 2020-10-26 | Stop reason: HOSPADM

## 2020-10-24 RX ORDER — OXYTOCIN/0.9 % SODIUM CHLORIDE 30/500 ML
100 PLASTIC BAG, INJECTION (ML) INTRAVENOUS CONTINUOUS
Status: DISCONTINUED | OUTPATIENT
Start: 2020-10-24 | End: 2020-10-26 | Stop reason: HOSPADM

## 2020-10-24 RX ORDER — LIDOCAINE HYDROCHLORIDE 5 MG/ML
INJECTION, SOLUTION INFILTRATION; PERINEURAL PRN
Status: DISCONTINUED | OUTPATIENT
Start: 2020-10-24 | End: 2020-10-26 | Stop reason: HOSPADM

## 2020-10-24 RX ORDER — OXYCODONE AND ACETAMINOPHEN 5; 325 MG/1; MG/1
1 TABLET ORAL
Status: DISCONTINUED | OUTPATIENT
Start: 2020-10-24 | End: 2020-10-25 | Stop reason: CLARIF

## 2020-10-24 RX ORDER — HYDROCORTISONE 2.5 %
CREAM (GRAM) TOPICAL 3 TIMES DAILY PRN
Status: DISCONTINUED | OUTPATIENT
Start: 2020-10-24 | End: 2020-10-26 | Stop reason: HOSPADM

## 2020-10-24 RX ORDER — OXYTOCIN 10 [USP'U]/ML
10 INJECTION, SOLUTION INTRAMUSCULAR; INTRAVENOUS
Status: DISCONTINUED | OUTPATIENT
Start: 2020-10-24 | End: 2020-10-25 | Stop reason: CLARIF

## 2020-10-24 RX ORDER — TRANEXAMIC ACID 10 MG/ML
1 INJECTION, SOLUTION INTRAVENOUS EVERY 30 MIN PRN
Status: DISCONTINUED | OUTPATIENT
Start: 2020-10-24 | End: 2020-10-25 | Stop reason: CLARIF

## 2020-10-24 RX ORDER — LIDOCAINE HYDROCHLORIDE AND EPINEPHRINE 15; 5 MG/ML; UG/ML
INJECTION, SOLUTION EPIDURAL PRN
Status: DISCONTINUED | OUTPATIENT
Start: 2020-10-24 | End: 2020-10-26 | Stop reason: HOSPADM

## 2020-10-24 RX ORDER — NALBUPHINE HYDROCHLORIDE 10 MG/ML
2.5-5 INJECTION, SOLUTION INTRAMUSCULAR; INTRAVENOUS; SUBCUTANEOUS EVERY 6 HOURS PRN
Status: DISCONTINUED | OUTPATIENT
Start: 2020-10-24 | End: 2020-10-25 | Stop reason: CLARIF

## 2020-10-24 RX ORDER — MODIFIED LANOLIN
OINTMENT (GRAM) TOPICAL
Status: DISCONTINUED | OUTPATIENT
Start: 2020-10-24 | End: 2020-10-26 | Stop reason: HOSPADM

## 2020-10-24 RX ORDER — OXYTOCIN 10 [USP'U]/ML
10 INJECTION, SOLUTION INTRAMUSCULAR; INTRAVENOUS
Status: DISCONTINUED | OUTPATIENT
Start: 2020-10-24 | End: 2020-10-26 | Stop reason: HOSPADM

## 2020-10-24 RX ADMIN — LIDOCAINE HYDROCHLORIDE AND EPINEPHRINE 3 ML: 15; 5 INJECTION, SOLUTION EPIDURAL at 20:39

## 2020-10-24 RX ADMIN — LIDOCAINE HYDROCHLORIDE 5 ML: 5 INJECTION, SOLUTION INFILTRATION; PERINEURAL at 20:41

## 2020-10-24 RX ADMIN — Medication 5 ML: at 20:42

## 2020-10-24 RX ADMIN — Medication 12 ML/HR: at 20:40

## 2020-10-24 RX ADMIN — SODIUM CHLORIDE, POTASSIUM CHLORIDE, SODIUM LACTATE AND CALCIUM CHLORIDE 1000 ML: 600; 310; 30; 20 INJECTION, SOLUTION INTRAVENOUS at 19:46

## 2020-10-24 RX ADMIN — Medication 340 ML/HR: at 22:32

## 2020-10-24 RX ADMIN — SODIUM CHLORIDE, POTASSIUM CHLORIDE, SODIUM LACTATE AND CALCIUM CHLORIDE: 600; 310; 30; 20 INJECTION, SOLUTION INTRAVENOUS at 21:00

## 2020-10-25 LAB
LABORATORY COMMENT REPORT: NORMAL
SARS-COV-2 RNA SPEC QL NAA+PROBE: NEGATIVE
SARS-COV-2 RNA SPEC QL NAA+PROBE: NORMAL
SPECIMEN SOURCE: NORMAL
SPECIMEN SOURCE: NORMAL
T PALLIDUM AB SER QL: NONREACTIVE

## 2020-10-25 PROCEDURE — 120N000012 HC R&B POSTPARTUM

## 2020-10-25 PROCEDURE — 250N000013 HC RX MED GY IP 250 OP 250 PS 637: Performed by: OBSTETRICS & GYNECOLOGY

## 2020-10-25 PROCEDURE — 250N000009 HC RX 250: Performed by: OBSTETRICS & GYNECOLOGY

## 2020-10-25 PROCEDURE — U0003 INFECTIOUS AGENT DETECTION BY NUCLEIC ACID (DNA OR RNA); SEVERE ACUTE RESPIRATORY SYNDROME CORONAVIRUS 2 (SARS-COV-2) (CORONAVIRUS DISEASE [COVID-19]), AMPLIFIED PROBE TECHNIQUE, MAKING USE OF HIGH THROUGHPUT TECHNOLOGIES AS DESCRIBED BY CMS-2020-01-R: HCPCS | Performed by: OBSTETRICS & GYNECOLOGY

## 2020-10-25 RX ORDER — MISOPROSTOL 200 UG/1
800 TABLET ORAL ONCE
Status: COMPLETED | OUTPATIENT
Start: 2020-10-25 | End: 2020-10-25

## 2020-10-25 RX ADMIN — IBUPROFEN 800 MG: 400 TABLET ORAL at 00:43

## 2020-10-25 RX ADMIN — ACETAMINOPHEN 650 MG: 325 TABLET, FILM COATED ORAL at 14:02

## 2020-10-25 RX ADMIN — MISOPROSTOL 800 MCG: 200 TABLET ORAL at 00:33

## 2020-10-25 RX ADMIN — DOCUSATE SODIUM 100 MG: 100 CAPSULE, LIQUID FILLED ORAL at 19:56

## 2020-10-25 RX ADMIN — Medication 100 ML/HR: at 00:00

## 2020-10-25 RX ADMIN — IBUPROFEN 600 MG: 600 TABLET ORAL at 14:02

## 2020-10-25 RX ADMIN — ACETAMINOPHEN 650 MG: 325 TABLET, FILM COATED ORAL at 19:56

## 2020-10-25 RX ADMIN — ACETAMINOPHEN 650 MG: 325 TABLET, FILM COATED ORAL at 06:36

## 2020-10-25 RX ADMIN — ACETAMINOPHEN 650 MG: 325 TABLET, FILM COATED ORAL at 00:43

## 2020-10-25 RX ADMIN — IBUPROFEN 600 MG: 600 TABLET ORAL at 06:36

## 2020-10-25 RX ADMIN — DOCUSATE SODIUM 100 MG: 100 CAPSULE, LIQUID FILLED ORAL at 11:05

## 2020-10-25 RX ADMIN — IBUPROFEN 600 MG: 600 TABLET ORAL at 19:56

## 2020-10-25 ASSESSMENT — ACTIVITIES OF DAILY LIVING (ADL)
DIFFICULTY_EATING/SWALLOWING: NO
WALKING_OR_CLIMBING_STAIRS_DIFFICULTY: NO
TOILETING_ISSUES: NO
DRESSING/BATHING_DIFFICULTY: NO
FALL_HISTORY_WITHIN_LAST_SIX_MONTHS: NO
FALL_HISTORY_WITHIN_LAST_SIX_MONTHS: NO
DIFFICULTY_COMMUNICATING: NO
WEAR_GLASSES_OR_BLIND: YES
CONCENTRATING,_REMEMBERING_OR_MAKING_DECISIONS_DIFFICULTY: NO
DOING_ERRANDS_INDEPENDENTLY_DIFFICULTY: NO
VISION_MANAGEMENT: GLASSES AND CONTACTS

## 2020-10-25 NOTE — PROVIDER NOTIFICATION
10/24/20 2037   Provider Notification   Provider Name/Title Dr Koehler   Method of Notification Phone   Notification Reason SVE;Status Update     Call to MD, patient is 9-10 cm, feeling pressure with the contractions and is still going to follow through with getting an epidural. MD stated she will be on her way into the hospital.

## 2020-10-25 NOTE — PLAN OF CARE
Patient requested epidural. [facility] Dr. Sims paged with return call received. ?Orders entered, medications obtained, informed consent obtained, and consent signed and witnessed. ?Time out completed. ?During procedure test dose done and was negative. ?Epidural bolus completed by Dr Sims, epidural pump settings verified by Dr Sims and attached to patient running at 12 ml/hr see MAR. Straight catheterized for 125mL. Infant heading closed catheter with ctx. Patient stable since epidural placement will continue to monitor.

## 2020-10-25 NOTE — H&P
[facility] Labor and Delivery History and Physical    Courtney Clarke MRN# [id number]   Age: 30 year old YOB: 1990     Date of Admission:  10/24/2020    Primary care provider: Jenny Landrum           Chief Complaint:   Courtney Clarke is a 30 year old female who is 37w2d pregnant and being admitted for active labor management and SROM.          Pregnancy history:     OBSTETRIC HISTORY:    OB History    Para Term  AB Living   1 0 0 0 0 0   SAB TAB Ectopic Multiple Live Births   0 0 0 0 0      # Outcome Date GA Lbr [place]/2nd Weight Sex Delivery Anes PTL Lv   1 Current                EDC: Estimated Date of Delivery: 20    Prenatal Labs:   Lab Results   Component Value Date    ABO A 2020    RH Pos 2020    AS Negative 2020    HEPBANG negative 2020    CHPCRT  2010     Negative for C. trachomatis rRNA by transcription mediated amplification.   A negative result by transcription mediated amplification does not preclude the   presence of C. trachomatis infection because results are dependent on proper   and adequate collection, absence of inhibitors, and sufficient rRNA to be   detected.    GCPCRT  2010     Negative for N. gonorrhoeae rRNA by transcription mediated amplification.   A negative result by transcription mediated amplification does not preclude the   presence of N. gonorrhoeae infection because results are dependent on proper   and adequate collection, absence of inhibitors, and sufficient rRNA to be   detected.    RUBELLAABIGG Immune 2020       GBS Status:   Lab Results   Component Value Date    GBS Negative 10/16/2020       Active Problem List  Patient Active Problem List   Diagnosis     Chronic rhinitis     CARDIOVASCULAR SCREENING; LDL GOAL LESS THAN 160     Yeast vaginitis     BMI 31.0-31.9,adult     Pregnancy related condition     Labor and delivery, indication for care       Medication Prior to  Admission  Medications Prior to Admission   Medication Sig Dispense Refill Last Dose     Prenatal Vit-Fe Fumarate-FA (PRENATAL MULTIVITAMIN W/IRON) 27-0.8 MG tablet Take 1 tablet by mouth daily   10/24/2020 at Unknown time   .        Maternal Past Medical History:     Past Medical History:   Diagnosis Date     CARDIOVASCULAR SCREENING; LDL GOAL LESS THAN 160 10/31/2010     Chronic rhinitis 4/21/2009     Yeast vaginitis 8/13/2013                       Family History:   This patient has no significant family history            Social History:   This patient has no significant social history         Review of Systems:   The Review of Systems is negative other than noted in the HPI          Physical Exam:   Vitals were reviewed  All vitals stable  Constitutional:   awake, alert, cooperative, no apparent distress, and appears stated age   Eyes:   Lids and lashes normal, pupils equal, round and reactive to light, extra ocular muscles intact, sclera clear, conjunctiva normal   ENT:   Normocephalic, without obvious abnormality, atraumatic, sinuses nontender on palpation, external ears without lesions, oral pharynx with moist mucous membranes, tonsils without erythema or exudates, gums normal and good dentition.   Neck:   Supple, symmetrical, trachea midline, no adenopathy, thyroid symmetric, not enlarged and no tenderness, skin normal   Hematologic / Lymphatic:   no cervical lymphadenopathy   Back:   Symmetric, no curvature, spinous processes are non-tender on palpation, paraspinous muscles are non-tender on palpation, no costal vertebral tenderness   Lungs:   No increased work of breathing, good air exchange, clear to auscultation bilaterally, no crackles or wheezing   Cardiovascular:   Normal apical impulse, regular rate and rhythm, normal S1 and S2, no S3 or S4, and no murmur noted   Abdomen:   No scars, normal bowel sounds, soft, non-distended, non-tender, no masses palpated, no hepatosplenomegally   Chest / Breast:    Breasts symmetrical, skin without lesion(s), no nipple retraction or dimpling, no nipple discharge, no masses palpated, no axillary or supraclavicular adenopathy   Genitounirinary:   External Genitalia:  General appearance; normal  Cervix:  6 cm, grossly ruptured per RN exam on presentation   Musculoskeletal:   There is no redness, warmth, or swelling of the joints.  Full range of motion noted.  Motor strength is 5 out of 5 all extremities bilaterally.  Tone is normal.   Neurologic:   Awake, alert, oriented to name, place and time.  Cranial nerves II-XII are grossly intact.  Motor is 5 out of 5 bilaterally.  Cerebellar finger to nose, heel to shin intact.  Sensory is intact.  Babinski down going, Romberg negative, and gait is normal.   Neuropsychiatric:   General: normal, calm and normal eye contact   Skin:   no bruising or bleeding      Cervix:   Membranes: SROM   Dilation: 6   Effacement: Deferred   Station:0   Consistency: soft   Position: Mid  Presentation:Vertex  Fetal Heart Rate Tracing: reactive and reassuring  Tocometer: frequency q 3 minutes                       Assessment:   Courtney Clarke is a 37w2d pregnant female admitted with active labor management, SROM and contractions.  GBS negative  EFW 8 lbs  Pregnancy essentially uncomplicated        Plan:   Admit - see IP orders  Obtain COVID screen on admission  MD consultant on call Rodo/ available prn  Anticipate     Jodi Koehler MD, MD

## 2020-10-25 NOTE — ANESTHESIA PROCEDURE NOTES
Procedure note : epidural catheter      Staff -   Anesthesiologist:  Ankit Sims MD  Performed By: anesthesiologist  Referred By: OB  Pre-Procedure  Performed by Ankit Sims MD  Referred by OB  Location: OB      Pre-Anesthestic Checklist: patient identified, IV checked, risks and benefits discussed, informed consent, monitors and equipment checked and pre-op evaluation    Timeout  Correct Patient: Yes   Correct Procedure: Yes   Correct Site: Yes     Correct Position: Yes     .   Procedure Documentation    .    Procedure: epidural catheter, .   Patient Position:sitting Insertion Site:L3-4  (midline approach) Injection technique: LORT saline   Local skin infiltrated with 3 mL of 2% lidocaine.  KAMARI at 55 cm    Patient Prep/Sterile Barriers; mask, sterile gloves, chlorhexidine gluconate and isopropyl alcohol, patient draped.  .  Needle: Touhy needle   Needle Gauge: 17.    Needle Length (Inches) 3.5   # of attempts: 1 and # of redirects:  .    Catheter: 19 G . .  Catheter threaded easily  3 cm epidural space.  8 cm at skin.   .    Assessment/Narrative  Paresthesias: No.  .  .  Aspiration negative for heme or CSF  . Test dose of 3 mL lidocaine 1.5% w/ 1:200,000 epinephrine at. Test dose negative for signs of intravascular, subdural or intrathecal injection.

## 2020-10-25 NOTE — PLAN OF CARE
Pt. admitted from L&D  via wheelchair and transferred to bed with standby assistance. Pt. arrived with baby and was accompanied by spouse, Miguel, and arrived with personal belongings. Report was taken from Patti BATEMAN RN in L&D. VSS. Fundus is firm and midline.  Vaginal bleeding is scant.  IV saline locked.  Pt. oriented to the room and call light system.

## 2020-10-25 NOTE — PLAN OF CARE
0005  Dr Koehler paged with return call received.  Update included but not limited to:  Blood loss since this RN assumed care.  Barone catheter placement with plan to remove prior to move to [facility], oxytocin infusing at 340 ml/hr.  .    Plan per provider Administer misoprostil 800 mcg rectally if bleeding has not decreased and/or uterus not firm with next check at 0015.

## 2020-10-25 NOTE — ANESTHESIA PREPROCEDURE EVALUATION
"Anesthesia Pre-Procedure Evaluation    Patient: Courtney Clarke   MRN: [id number] : 1990          Preoperative Diagnosis: * No surgery found *        Past Medical History:   Diagnosis Date     CARDIOVASCULAR SCREENING; LDL GOAL LESS THAN 160 10/31/2010     Chronic rhinitis 2009     Yeast vaginitis 2013     Past Surgical History:   Procedure Laterality Date     PE TUBES         Anesthesia Evaluation       history and physical reviewed .      No history of anesthetic complications          ROS/MED HX    ENT/Pulmonary:  - neg pulmonary ROS     Neurologic:  - neg neurologic ROS     Cardiovascular:  - neg cardiovascular ROS       METS/Exercise Tolerance:     Hematologic:         Musculoskeletal:         GI/Hepatic:  - neg GI/hepatic ROS       Renal/Genitourinary:         Endo:         Psychiatric:         Infectious Disease:         Malignancy:         Other:                     neg OB ROS            Physical Exam  Normal systems: cardiovascular, pulmonary and dental    Airway   Mallampati: II  TM distance: > 3 FB  Neck ROM: full  Mouth opening: > 3 cm    Dental     Cardiovascular       Pulmonary             Lab Results   Component Value Date    WBC 11.9 (H) 10/24/2020    HGB 13.3 10/24/2020    HCT 38.1 10/24/2020     10/24/2020    GLC 94 2018       Preop Vitals  BP Readings from Last 3 Encounters:   10/24/20 115/67   10/20/20 120/71   20 95/66    Pulse Readings from Last 3 Encounters:   20 69   18 64   17 78      Resp Readings from Last 3 Encounters:   10/24/20 16   10/20/20 18   20 16    SpO2 Readings from Last 3 Encounters:   20 99%   18 100%   17 100%      Temp Readings from Last 1 Encounters:   10/24/20 36.9  C (98.4  F) (Temporal)    Ht Readings from Last 1 Encounters:   20 1.727 m (5' 8\")      Wt Readings from Last 1 Encounters:   20 88.9 kg (196 lb)    Estimated body mass index is 29.8 kg/m  as calculated from the " "following:    Height as of 1/24/20: 1.727 m (5' 8\").    Weight as of 1/24/20: 88.9 kg (196 lb).       Anesthesia Plan      History & Physical Review      ASA Status:  2 .  OB Epidural Asa: 2       Plan for Epidural            Postoperative Care      Consents  Anesthetic plan, risks, benefits and alternatives discussed with:  Patient..                 Ankit Sims MD  "

## 2020-10-25 NOTE — L&D DELIVERY NOTE
OB Vaginal Delivery Note    Courtney Clarke MRN# [id number]   Age: 30 year old YOB: 1990       GA: 37w2d  GP:   Labor Complications: None   EBL:   mL  Delivery QBL:    Delivery Type: Vaginal, Spontaneous   ROM to Delivery Time: (Delivered) Hours: 3 Minutes: 46   Weight:     1 Minute 5 Minute 10 Minute   Apgar Totals:                 Delivery Details:  Courtney Clarke, a 30 year old  female delivered a viable infant with apgars of   and  . Patient was fully dilated and pushing after   hours   minutes in active labor. Delivery was via vaginal, spontaneous  to a sterile field under   anesthesia. Infant delivered in vertex  right  occiput  anterior  position. Anterior and posterior shoulders delivered without difficulty. The cord was clamped, cut twice and   were noted. Cord blood was obtained in routine fashion with the following disposition:  .      Cord complications:    Placenta delivered at  . Placental disposition was Hospital disposal . Fundal massage performed and fundus found to be firm.     Episiotomy: none    Perineum, vagina, cervix were inspected, and the following lacerations were noted:   Perineal lacerations: 3rd                Evaluation of perineum and vagina demonstrated 3rd degree laceration extending 1 cm beyond hymen into vagina. Capsule of rectal sphincter was brought together with interrupted figure of eight suture using 2.0 vicryl. Remainder of laceration was reapproximated in standard fashion using 3.0 vicryl. Rectovaginal exam confirmed proper repair.   Excellent hemostasis was noted. Needle count correct. Infant and patient in delivery room in good and stable condition.        Jonas Clarke-Courtney [[id number]]    Labor Event Times    Labor onset date: 10/24/20 Onset time:  7:45 PM   Dilation complete date: 10/24/20 Complete time:  8:59 PM   Start pushing date/time: 10/24/2020 2105      Rupture date/time: 10/24/20 1840   Rupture type: Spontaneous rupture of  membranes occuring during spontaneous labor or augmentation  Fluid color: Clear  Fluid odor: Normal     Delivery/Placenta Date and Time    Delivery Date: 10/24/20 Delivery Time: 10:26 PM      Labor Events and Shoulder Dystocia    Fetal Tracing Prior to Delivery: Category 1  Shoulder dystocia present?: Neg     Delivery (Maternal) (Provider to Complete) ([id number])    Episiotomy: None  Perineal lacerations: 3rd       Blood Loss  Mother: Courtney Clarke #[id number]   Start of Mother's Information    IO Blood Loss  10/24/20 1945 - 10/24/20 2254    None           End of Mother's Information  Mother: Courtney Clarke #[id number]          Delivery - Provider to Complete ([id number])    Delivering clinician: Jodi Koehler MD  Attempted Delivery Types (Choose all that apply): Spontaneous Vaginal Delivery  Delivery Type (Choose the 1 that will go to the Birth History): Vaginal, Spontaneous                                 Placenta    Delayed Cord Clamping: Done  Removal: Spontaneous  Disposition: Hospital disposal           Presentation and Position    Presentation: Vertex    Position: Right Occiput Anterior                 Jodi Koehler MD, MD

## 2020-10-25 NOTE — PLAN OF CARE
RN continuously at bedside, monitoring fetal status, maternal pushing efforts, and progress of pushing.  of viable male infant. See delivery summary for details.

## 2020-10-25 NOTE — PROGRESS NOTES
PPD #1    Notices some perineal pain, but overall doing well. Working on breastfeeding    /67   Pulse 61   Temp 98.6  F (37  C) (Oral)   Resp 16   LMP 2020   SpO2 99%   Breastfeeding Unknown     A/P: PPD 1 s/p  with repair of 3rd degree laceration    Routine postpartum care    Will add oxycodone as needed for pain    Discharge home tomorrow    Jennifer L. Schwab, MD

## 2020-10-25 NOTE — LACTATION NOTE
"Initial visit with Courtney, FOB, and baby Gerardo. Courtney was working on breastfeeding at time of visit. Courtney has large breasts with smooth nipples and admits she is still feeling fairly awkward with holding and positioning. A nipple shield had been given to Courtney earlier today but she wasn't sure if she should try to use it with every feeding. LC discussed the benefits of using the nipple shield (when mom has smooth nipples) to teach infant how to latch with a wide mouth. The shield can also help infant maintain latch. So LC educated how to properly place the nipple shield. Courtney has been primarily trying cross cradle but after struggling to latch LC suggested football hold. We got infant placed in football hold on R breast and infant did begin suckling and nursed for about 10 minutes before tuckering out. We tried L breast in football hold but infant was too sleepy to nurse further.       We reviewed  breastfeeding basics: 1) watch for early feeding cues (licking lips, stirring or rooting, sucking movement with mouth, hands to mouth), 2) feed infant on demand, a minimum of 8 times in 24 hours, and 3) techniques to waking a sleepy baby to nurse (undress infant, change diaper if necessary, gently stroking bottom of feet and back, snuggling infant skin to skin, expressing colostrum). Highlighted breast feeding section in our \"Guide to Postpartum and [place] Care\" and showed how to record infant feedings along with voids and stools in the provided feeding log. Instructed in hand expression, encouraging mother to watch (provided) hand expression video. Parents educated to \"typical\"  behavior, emphasizing normal feeding patterns from birth through day 3. Answered questions regarding \"how to know when infant is done at the breast\".     We reviewed breastfeeding positions and techniques to obtaining/maintaining a deep latch (including nose to nipple alignment and supporting infant's shoulder blades vs head " "when bringing infant in to latch). Discussed BF should feel like a strong \"tug or pull\" when infant is suckling and if mother experiences a \"pinching or biting\" sensation, how to un-latch infant properly, assess nipple shape and make any necessary adjustments with positioning before re-latching. Discussed physiology of milk production from colostrum through milk coming in and how the breasts should begin to feel \"heavy or full\" between day 3-5.  Discussed normal infant weight loss and when infant should be back to birth weight.     Encouraged Courtney to call primary RN for latch check/assistance.    Appreciative of visit.    Elizabeth Perez RN  Lactation Educator            "

## 2020-10-25 NOTE — PLAN OF CARE
Vital signs stable. Postpartum assessment WDL. Pain controlled with tylenol and ibuprofen. Patient ambulating to bathroom with standby assistance, voided 350 mL @ 0520. Also voided in L&D before coming up to the unit. Working on breastfeeding every 2-3 hours, full staff assistance needed with positioning/latch. Patient and infant bonding well. Will continue with current plan of care.

## 2020-10-25 NOTE — PLAN OF CARE
Assessment and vital signs within normal limits.  Patient is up independently, voiding without difficulty, pain well controlled with medications given as prescribed. Taking ibuprofen and tylenol.  Working on breastfeeding, , and  cares. Encouraged to continue to ambulate as able and void frequently.  Continue to monitor and notify MD as needed.

## 2020-10-25 NOTE — PROGRESS NOTES
"OB  Patient admitted recently after SROM @1840 with onset of stronger contractions. Patient getting more comfortable after epidural placed, noting rectal pressure with contractions  /67   Temp 98.4  F (36.9  C) (Temporal)   Resp 16   LMP 2020      Cx: C/100/+2  FHT baseline 130, moderate variability, accels, cat 1  Ctxns q2-3\"    A/P: 31 yo  @ 37 2/7 weeks, SROM, active labor, GBS neg  Patient to begin pushing now.  COVID screening to be done.    Jodi Koehler MD, MD on 10/24/2020 at 9:18 PM  "

## 2020-10-25 NOTE — PLAN OF CARE
0220  Transferred to [facility] room 406 via wheelchair. Baby transferred via mothers arms. Accompanied by Registered Nurse.  Personal belongings moved with patient to [facility].  Report given to Perry ELAM RN.  Fundus and lochia checked with receiving RN.  Patient tolerated transfer and is stable.     ID bands double-checked with receiving RN.

## 2020-10-25 NOTE — PLAN OF CARE
37+2  arrived to room 219 via wheelchair. Admitted for SROM. Plan of care discussed. Pt oriented to room and call bell, which is in pt's reach. Orders obtained. Monitors applied and PIV started after verbal consent. Denies further questions, comments, or concerns.

## 2020-10-26 VITALS
TEMPERATURE: 97.6 F | SYSTOLIC BLOOD PRESSURE: 117 MMHG | HEART RATE: 56 BPM | DIASTOLIC BLOOD PRESSURE: 71 MMHG | OXYGEN SATURATION: 99 % | RESPIRATION RATE: 16 BRPM

## 2020-10-26 PROCEDURE — 250N000013 HC RX MED GY IP 250 OP 250 PS 637: Performed by: OBSTETRICS & GYNECOLOGY

## 2020-10-26 RX ADMIN — DOCUSATE SODIUM 100 MG: 100 CAPSULE, LIQUID FILLED ORAL at 07:44

## 2020-10-26 RX ADMIN — ACETAMINOPHEN 650 MG: 325 TABLET, FILM COATED ORAL at 02:49

## 2020-10-26 RX ADMIN — IBUPROFEN 600 MG: 600 TABLET ORAL at 09:18

## 2020-10-26 RX ADMIN — IBUPROFEN 600 MG: 600 TABLET ORAL at 02:48

## 2020-10-26 RX ADMIN — ACETAMINOPHEN 650 MG: 325 TABLET, FILM COATED ORAL at 09:18

## 2020-10-26 NOTE — LACTATION NOTE
Follow up Lactation visit with Courtney, significant other Miguel & baby boy Gerardo. Getting ready for discharge. Courtney reports feeding is going well, using a nipple shield. At time of visit, baby at left breast, lips flanged widely, nutritive suck pattern observed, a few swallows heard. Encouraged listening for audible swallows with each feeding, then looking for milk inside shield after feedings.    Discussed cluster feeding, what it is and when to expect it, The Second Night, satiety cues, feeding cues, and reviewed Feeding Log for home use.     Reviewed milk supply and engorgement. Reviewed typical timeline of milk supply initiation and progression over first 3-5 days postpartum. Discussed comfort measures for engorgement, plugged duct treatment, and warning signs of breast infection. Discussed using breast pump in preparation for return to work. Discussed milk storage, introducing a bottle, and general recommendation to wait to start pumping for milk storage or bottle feeding in preparation for return to work until breastfeeding is well established in 3-4 weeks. Exceptions: if feeding is poor or baby needs to supplement for medical reasons.    Feeding plan: Recommend unlimited, frequent breast feedings: At least 8 - 12 times every 24 hours. Avoid pacifiers and supplementation with formula unless medically indicated. Encouraged use of feeding log and to record feedings, and void/stool patterns. Courtney has a breast pump for home use. Follow up with [facility], encouraged Lactation follow up in clinic due to first time breastfeeding using nipple shield at discharge. Reviewed outpatient lactation resources. Courtney & Miguel appreciative of visit.    Nancie Chester, RN-C, IBCLC, MNN, PHN, BSN

## 2020-10-26 NOTE — DISCHARGE SUMMARY
Admit Date:     10/24/2020   Discharge Date:           The patient is a 30-year-old primigravida female who went into spontaneous labor at 37 and 2/7 weeks' gestation and went on to deliver an 8-pound male infant.  She had a third-degree laceration, which was repaired.      Postpartum course has been uncomplicated.  She remains afebrile.  She is breastfeeding without difficulties.      The patient is being discharged later today in satisfactory condition, given routine postpartum instructions, will be seen in the office in 6 weeks.  Continue taking her prenatal vitamins while breastfeeding.  She will do Sitz baths and typical post-repair management.         WESLEY CORMIER MD             D: 10/26/2020   T: 10/26/2020   MT: PETROS      Name:     VALENTINE FLETCHER   MRN:      [id number]        Account:        [id number]   :      1990           Admit Date:     10/24/2020                                  Discharge Date:       Document: [id number]       cc: Jenny Landrum MD

## 2020-10-26 NOTE — ANESTHESIA POSTPROCEDURE EVALUATION
Patient: Courtney Clarke    * No procedures listed *    Diagnosis:* No pre-op diagnosis entered *  Diagnosis Additional Information: No value filed.    Anesthesia Type:  No value filed.    Note:  Anesthesia Post Evaluation    Patient location during evaluation: bedside  Patient participation: Able to fully participate in evaluation  Level of consciousness: awake  Pain management: adequate  Airway patency: patent  Cardiovascular status: acceptable  Respiratory status: acceptable  Hydration status: acceptable  PONV: none     Anesthetic complications: None          Last vitals:  Vitals:    10/25/20 1100 10/25/20 1745 10/26/20 0000   BP: 120/64 114/68 117/64   Pulse: 68 60 62   Resp: 16 16 12   Temp: 36.7  C (98.1  F) 36.7  C (98.1  F) 36.9  C (98.4  F)   SpO2:            Electronically Signed By: Kelly Walls MD, MD  October 26, 2020  7:28 AM

## 2020-10-26 NOTE — PROGRESS NOTES
PPD #2    No complaints.  Breastfeeding, has pump at home.  Tylenol and ibuprofen for pain    117/71     97.6    Normal postpartum recovery    Home  vits

## 2020-10-26 NOTE — PLAN OF CARE
Vital signs stable. Postpartum assessment WDL. Uterine fundus is firm and noted at U/-1. Scant lochia rubra. Third degree laceration noted. Ice and tucks on. No clots noted. Using Tylenol and Ibuprofen for pain. Up and ambulating; free of dizziness. Breastfeeding well. Questions/concerns addressed.

## 2020-10-29 ENCOUNTER — HOSPITAL ENCOUNTER (OUTPATIENT)
Facility: CLINIC | Age: 30
End: 2020-10-29
Admitting: OBSTETRICS & GYNECOLOGY
Payer: COMMERCIAL

## 2020-12-21 ENCOUNTER — TELEPHONE (OUTPATIENT)
Dept: FAMILY MEDICINE | Facility: CLINIC | Age: 30
End: 2020-12-21

## 2020-12-21 NOTE — TELEPHONE ENCOUNTER
Patient Quality Outreach      Summary:    Patient has the following on her problem list/HM: None     Patient is due/failing the following:   Cervical Cancer Screening - PAP Needed   See notes below     Type of outreach: Did not contact patient due to notes of previous visits.   note in last physical of Jan 2020 states patient next due for pap  in 2/2022  History of abnormal Pap smear: NO - age 30-65 PAP every 5 years with negative HPV co-testing recommended.  This note in previous visit.    Questions for provider review:    None                                                                                                                                Kathryn Chua     Chart routed to Care Team.

## 2021-01-15 ENCOUNTER — HEALTH MAINTENANCE LETTER (OUTPATIENT)
Age: 31
End: 2021-01-15

## 2021-03-14 ENCOUNTER — HEALTH MAINTENANCE LETTER (OUTPATIENT)
Age: 31
End: 2021-03-14

## 2021-08-26 ENCOUNTER — VIRTUAL VISIT (OUTPATIENT)
Dept: FAMILY MEDICINE | Facility: CLINIC | Age: 31
End: 2021-08-26
Payer: COMMERCIAL

## 2021-08-26 DIAGNOSIS — R09.82 POST-NASAL DRAINAGE: ICD-10-CM

## 2021-08-26 DIAGNOSIS — J30.2 SEASONAL ALLERGIC RHINITIS, UNSPECIFIED TRIGGER: ICD-10-CM

## 2021-08-26 DIAGNOSIS — J02.9 SORE THROAT: Primary | ICD-10-CM

## 2021-08-26 PROCEDURE — 99214 OFFICE O/P EST MOD 30 MIN: CPT | Mod: 95 | Performed by: FAMILY MEDICINE

## 2021-08-26 RX ORDER — FLUTICASONE PROPIONATE 50 MCG
1 SPRAY, SUSPENSION (ML) NASAL DAILY
Qty: 9.9 ML | Refills: 0 | Status: SHIPPED | OUTPATIENT
Start: 2021-08-26

## 2021-08-26 NOTE — PROGRESS NOTES
Courtney is a 31 year old who is being evaluated via a billable video visit.      How would you like to obtain your AVS? MyChart  If the video visit is dropped, the invitation should be resent by: Text to cell phone: [phone]  Will anyone else be joining your video visit? No      Video Start Time: 11: 06    Assessment & Plan     Sore throat    Post-nasal drainage  - fluticasone (FLONASE) 50 MCG/ACT nasal spray; Spray 1 spray into both nostrils daily    Seasonal allergic rhinitis, unspecified trigger    Her sore throat symptoms appear to be due to to a viral infection.  She describes having a significant amount of mucus and postnasal drainage that is likely contributing to the soreness in her throat.  She also has a history of seasonal allergies which may be contributing.  I recommended adding Flonase and continuing the Zyrtec.  We discussed other symptomatic cares and I encouraged her to stay well-hydrated and get plenty of rest.  It is reassuring that the recent rapid strep test and strep culture result was negative.  The Covid test was also negative.  I offered to recheck these which she declined for now.  If symptoms do not improve or if they become acutely worse she will seek medical attention.                   Return in about 2 weeks (around 9/9/2021) for Follow up if symptoms not improving..    Augusto Figueroa, [facility]   Courtney is a 31 year old who presents for the following health issues     HPI         She describes having a sore throat that started 3 days ago.  Other symptoms include significant mucus in her throat and sinuses.  She describes having a lot of postnasal drainage as well.  She was seen at an outside urgent care 2 days ago and had a negative Covid and strep test.  She reports that the strep culture was also negative.  She is taken ibuprofen and she takes Zyrtec to help with allergies.  She denies seeing any white patches or spots in the back  of her throat.  She reports that her son was sick last week and was treated for an ear infection.  She is not having chest congestion or shortness of breath symptoms.  She describes having some pain with swallowing.    Review of Systems   Constitutional, HEENT, cardiovascular, pulmonary, gi and gu systems are negative, except as otherwise noted.      Objective           Vitals:  No vitals were obtained today due to virtual visit.    Physical Exam   GENERAL: Appears fatigued, alert and no distress  EYES: Eyes grossly normal to inspection.  No discharge or erythema, or obvious scleral/conjunctival abnormalities.  MOUTH: The posterior oropharynx appears erythematous without signs of exudates on the video exam  RESP: No audible wheeze, cough, or visible cyanosis.  No visible retractions or increased work of breathing.    SKIN: Visible skin clear. No significant rash, abnormal pigmentation or lesions.  NEURO: Cranial nerves grossly intact.  Mentation and speech appropriate for age.  PSYCH: Mentation appears normal, affect normal/bright, judgement and insight intact, normal speech and appearance well-groomed.                Video-Visit Details    Type of service:  Video Visit    Video End Time:11: 20    Originating Location (pt. Location): Home    Distant Location (provider location):  [facility]     Platform used for Video Visit: [employer]

## 2021-10-24 ENCOUNTER — HEALTH MAINTENANCE LETTER (OUTPATIENT)
Age: 31
End: 2021-10-24

## 2022-04-10 ENCOUNTER — HEALTH MAINTENANCE LETTER (OUTPATIENT)
Age: 32
End: 2022-04-10

## 2022-08-19 ENCOUNTER — TRANSFERRED RECORDS (OUTPATIENT)
Dept: HEALTH INFORMATION MANAGEMENT | Facility: CLINIC | Age: 32
End: 2022-08-19

## 2022-08-25 ENCOUNTER — TRANSCRIBE ORDERS (OUTPATIENT)
Dept: MATERNAL FETAL MEDICINE | Facility: CLINIC | Age: 32
End: 2022-08-25

## 2022-08-25 DIAGNOSIS — O26.90 PREGNANCY RELATED CONDITION, ANTEPARTUM: Primary | ICD-10-CM

## 2022-09-20 ENCOUNTER — TRANSFERRED RECORDS (OUTPATIENT)
Dept: HEALTH INFORMATION MANAGEMENT | Facility: CLINIC | Age: 32
End: 2022-09-20

## 2022-09-22 ENCOUNTER — PRE VISIT (OUTPATIENT)
Dept: MATERNAL FETAL MEDICINE | Facility: CLINIC | Age: 32
End: 2022-09-22

## 2022-09-29 ENCOUNTER — HOSPITAL ENCOUNTER (OUTPATIENT)
Dept: ULTRASOUND IMAGING | Facility: CLINIC | Age: 32
Discharge: HOME | End: 2022-09-29
Attending: OBSTETRICS & GYNECOLOGY
Payer: COMMERCIAL

## 2022-09-29 ENCOUNTER — OFFICE VISIT (OUTPATIENT)
Dept: MATERNAL FETAL MEDICINE | Facility: CLINIC | Age: 32
End: 2022-09-29
Attending: OBSTETRICS & GYNECOLOGY
Payer: COMMERCIAL

## 2022-09-29 DIAGNOSIS — O99.212 OBESITY AFFECTING PREGNANCY IN SECOND TRIMESTER: Primary | ICD-10-CM

## 2022-09-29 DIAGNOSIS — Z36.2 ENCOUNTER FOR FOLLOW-UP ULTRASOUND OF FETAL ANATOMY: ICD-10-CM

## 2022-09-29 DIAGNOSIS — O26.90 PREGNANCY RELATED CONDITION, ANTEPARTUM: ICD-10-CM

## 2022-09-29 PROCEDURE — 76811 OB US DETAILED SNGL FETUS: CPT

## 2022-09-29 PROCEDURE — 76811 OB US DETAILED SNGL FETUS: CPT | Mod: 26 | Performed by: OBSTETRICS & GYNECOLOGY

## 2022-09-29 NOTE — PROGRESS NOTES
"Please see \"imaging\" tab under \"Chart Review\" for details of today's US at the [facility].    Devin Gallardo MD  Maternal-Fetal Medicine    "

## 2022-10-15 ENCOUNTER — HEALTH MAINTENANCE LETTER (OUTPATIENT)
Age: 32
End: 2022-10-15

## 2022-10-18 ENCOUNTER — HOSPITAL ENCOUNTER (OUTPATIENT)
Dept: ULTRASOUND IMAGING | Facility: CLINIC | Age: 32
Discharge: HOME | End: 2022-10-18
Attending: OBSTETRICS & GYNECOLOGY
Payer: COMMERCIAL

## 2022-10-18 ENCOUNTER — OFFICE VISIT (OUTPATIENT)
Dept: MATERNAL FETAL MEDICINE | Facility: CLINIC | Age: 32
End: 2022-10-18
Attending: OBSTETRICS & GYNECOLOGY
Payer: COMMERCIAL

## 2022-10-18 DIAGNOSIS — Z36.2 ENCOUNTER FOR FOLLOW-UP ULTRASOUND OF FETAL ANATOMY: ICD-10-CM

## 2022-10-18 DIAGNOSIS — Z03.73 FETAL ANOMALY SUSPECTED BUT NOT FOUND: Primary | ICD-10-CM

## 2022-10-18 PROCEDURE — 76816 OB US FOLLOW-UP PER FETUS: CPT

## 2022-10-18 PROCEDURE — 76816 OB US FOLLOW-UP PER FETUS: CPT | Mod: 26 | Performed by: OBSTETRICS & GYNECOLOGY

## 2022-10-18 NOTE — PROGRESS NOTES
Please see the imaging tab for details of the ultrasound performed today.    Lizeth Gloria MD  Specialist in Maternal-Fetal Medicine

## 2022-11-04 ENCOUNTER — TRANSFERRED RECORDS (OUTPATIENT)
Dept: HEALTH INFORMATION MANAGEMENT | Facility: CLINIC | Age: 32
End: 2022-11-04

## 2023-01-19 ENCOUNTER — HOSPITAL ENCOUNTER (OUTPATIENT)
Facility: CLINIC | Age: 33
Discharge: HOME | End: 2023-01-19
Attending: OBSTETRICS & GYNECOLOGY | Admitting: OBSTETRICS & GYNECOLOGY
Payer: COMMERCIAL

## 2023-01-19 VITALS
TEMPERATURE: 99 F | BODY MASS INDEX: 38.65 KG/M2 | WEIGHT: 255 LBS | SYSTOLIC BLOOD PRESSURE: 119 MMHG | DIASTOLIC BLOOD PRESSURE: 65 MMHG | HEIGHT: 68 IN | RESPIRATION RATE: 16 BRPM

## 2023-01-19 PROCEDURE — 59025 FETAL NON-STRESS TEST: CPT

## 2023-01-19 PROCEDURE — G0463 HOSPITAL OUTPT CLINIC VISIT: HCPCS | Mod: 25

## 2023-01-19 RX ORDER — SERTRALINE HYDROCHLORIDE 25 MG/1
50 TABLET, FILM COATED ORAL DAILY
COMMUNITY

## 2023-01-19 RX ORDER — ONDANSETRON 2 MG/ML
4 INJECTION INTRAMUSCULAR; INTRAVENOUS EVERY 6 HOURS PRN
Status: DISCONTINUED | OUTPATIENT
Start: 2023-01-19 | End: 2023-01-19 | Stop reason: HOSPADM

## 2023-01-19 RX ORDER — PROCHLORPERAZINE 25 MG
25 SUPPOSITORY, RECTAL RECTAL EVERY 12 HOURS PRN
Status: DISCONTINUED | OUTPATIENT
Start: 2023-01-19 | End: 2023-01-19 | Stop reason: HOSPADM

## 2023-01-19 RX ORDER — ONDANSETRON 4 MG/1
4 TABLET, ORALLY DISINTEGRATING ORAL EVERY 6 HOURS PRN
Status: DISCONTINUED | OUTPATIENT
Start: 2023-01-19 | End: 2023-01-19 | Stop reason: HOSPADM

## 2023-01-19 RX ORDER — PROCHLORPERAZINE MALEATE 5 MG
10 TABLET ORAL EVERY 6 HOURS PRN
Status: DISCONTINUED | OUTPATIENT
Start: 2023-01-19 | End: 2023-01-19 | Stop reason: HOSPADM

## 2023-01-19 RX ORDER — METOCLOPRAMIDE HYDROCHLORIDE 5 MG/ML
10 INJECTION INTRAMUSCULAR; INTRAVENOUS EVERY 6 HOURS PRN
Status: DISCONTINUED | OUTPATIENT
Start: 2023-01-19 | End: 2023-01-19 | Stop reason: HOSPADM

## 2023-01-19 RX ORDER — METOCLOPRAMIDE 10 MG/1
10 TABLET ORAL EVERY 6 HOURS PRN
Status: DISCONTINUED | OUTPATIENT
Start: 2023-01-19 | End: 2023-01-19 | Stop reason: HOSPADM

## 2023-01-19 RX ORDER — ASPIRIN 81 MG/1
81 TABLET ORAL DAILY
Status: ON HOLD | COMMUNITY
End: 2023-02-17

## 2023-01-19 ASSESSMENT — ACTIVITIES OF DAILY LIVING (ADL)
ADLS_ACUITY_SCORE: 35
ADLS_ACUITY_SCORE: 31

## 2023-01-20 NOTE — DISCHARGE INSTRUCTIONS
Discharge Instruction for Undelivered Patients      You were seen for: Labor Assessment and Fetal Assessment  We Consulted:  You had (Test or Medicine)     Diet:   Drink 8 to 12 glasses of liquids (milk, juice, water) every day.  You may eat meals and snacks.     Activity:  Call your doctor or nurse midwife if your baby is moving less than usual.     Call your provider if you notice:  Swelling in your face or increased swelling in your hands or legs.  Headaches that are not relieved by Tylenol (acetaminophen).  Changes in your vision (blurring: seeing spots or stars.)  Nausea (sick to your stomach) and vomiting (throwing up).   Weight gain of 5 pounds or more per week.  Heartburn that doesn't go away.  Signs of bladder infection: pain when you urinate (use the toilet), need to go more often and more urgently.  The bag of bhatti (rupture of membranes) breaks, or you notice leaking in your underwear.  Bright red blood in your underwear.  Abdominal (lower belly) or stomach pain.  For first baby: Contractions (tightening) less than 5 minutes apart for one hour or more.  Second (plus) baby: Contractions (tightening) less than 10 minutes apart and getting stronger.  *If less than 34 weeks: Contractions (tightening) more than 6 times in one hour.  Increase or change in vaginal discharge (note the color and amount)  Other:     Follow-up:  As scheduled in the clinic

## 2023-01-20 NOTE — PLAN OF CARE
Data: Patient presented to [facility]: 2023  5:33 PM.  Reason for maternal/fetal assessment is fall/trauma. Patient reports slipping on the ice, falling to her buttocks then to her back against a snowbank.  Patient is a .  Prenatal record reviewed. Pregnancy has been uncomplicated.  Gestational Age 35w2d. VSS. Fetal movement present. Patient denies uterine contractions, leaking of vaginal fluid/rupture of membranes, vaginal bleeding, abdominal pain, pelvic pressure, nausea, vomiting, headache, visual disturbances, epigastric or URQ pain, significant edema. Support person is not present.   Action: Verbal consent for EFM. Triage assessment completed. Bill of rights reviewed.  Response: Patient verbalized agreement with plan. Dr Dio Bosch contacted with update and further orders received.

## 2023-01-20 NOTE — PROGRESS NOTES
2100  Patient given discharge instructions verbalizes understanding and patient signed after visit summary signature page.  Patient in agreement with plan.  Patient instructed to report change in fetal movement, vaginal leaking of fluid or bleeding, abdominal pain, or any concerns related to the pregnancy to her physician or midwife.  Monitors removed for patient to dress.    2101 Discharged home, undelivered, ambulatory at 2101.

## 2023-02-15 ENCOUNTER — ANESTHESIA EVENT (OUTPATIENT)
Dept: OBGYN | Facility: CLINIC | Age: 33
End: 2023-02-15
Payer: COMMERCIAL

## 2023-02-15 ENCOUNTER — HOSPITAL ENCOUNTER (INPATIENT)
Facility: CLINIC | Age: 33
LOS: 2 days | Discharge: HOME HEALTH | End: 2023-02-17
Attending: OBSTETRICS & GYNECOLOGY | Admitting: OBSTETRICS & GYNECOLOGY
Payer: COMMERCIAL

## 2023-02-15 ENCOUNTER — ANESTHESIA (OUTPATIENT)
Dept: OBGYN | Facility: CLINIC | Age: 33
End: 2023-02-15
Payer: COMMERCIAL

## 2023-02-15 DIAGNOSIS — Z3A.39 39 WEEKS GESTATION OF PREGNANCY: ICD-10-CM

## 2023-02-15 PROBLEM — Z34.90 PREGNANT AND NOT YET DELIVERED: Status: ACTIVE | Noted: 2023-02-15

## 2023-02-15 LAB
ABO/RH(D): NORMAL
ANTIBODY SCREEN: NEGATIVE
BASOPHILS # BLD AUTO: 0 10E3/UL (ref 0–0.2)
BASOPHILS NFR BLD AUTO: 0 %
EOSINOPHIL # BLD AUTO: 0.1 10E3/UL (ref 0–0.7)
EOSINOPHIL NFR BLD AUTO: 1 %
ERYTHROCYTE [DISTWIDTH] IN BLOOD BY AUTOMATED COUNT: 13.5 % (ref 10–15)
HCT VFR BLD AUTO: 35.3 % (ref 35–47)
HGB BLD-MCNC: 12.4 G/DL (ref 11.7–15.7)
IMM GRANULOCYTES # BLD: 0 10E3/UL
IMM GRANULOCYTES NFR BLD: 1 %
LYMPHOCYTES # BLD AUTO: 1.5 10E3/UL (ref 0.8–5.3)
LYMPHOCYTES NFR BLD AUTO: 22 %
MCH RBC QN AUTO: 31.9 PG (ref 26.5–33)
MCHC RBC AUTO-ENTMCNC: 35.1 G/DL (ref 31.5–36.5)
MCV RBC AUTO: 91 FL (ref 78–100)
MONOCYTES # BLD AUTO: 0.6 10E3/UL (ref 0–1.3)
MONOCYTES NFR BLD AUTO: 9 %
NEUTROPHILS # BLD AUTO: 4.6 10E3/UL (ref 1.6–8.3)
NEUTROPHILS NFR BLD AUTO: 67 %
NRBC # BLD AUTO: 0 10E3/UL
NRBC BLD AUTO-RTO: 0 /100
PLATELET # BLD AUTO: 287 10E3/UL (ref 150–450)
RBC # BLD AUTO: 3.89 10E6/UL (ref 3.8–5.2)
SPECIMEN EXPIRATION DATE: NORMAL
T PALLIDUM AB SER QL: NONREACTIVE
WBC # BLD AUTO: 6.8 10E3/UL (ref 4–11)

## 2023-02-15 PROCEDURE — 86850 RBC ANTIBODY SCREEN: CPT | Performed by: OBSTETRICS & GYNECOLOGY

## 2023-02-15 PROCEDURE — 36415 COLL VENOUS BLD VENIPUNCTURE: CPT | Performed by: OBSTETRICS & GYNECOLOGY

## 2023-02-15 PROCEDURE — 120N000012 HC R&B POSTPARTUM

## 2023-02-15 PROCEDURE — 86901 BLOOD TYPING SEROLOGIC RH(D): CPT | Performed by: OBSTETRICS & GYNECOLOGY

## 2023-02-15 PROCEDURE — 250N000011 HC RX IP 250 OP 636: Performed by: ANESTHESIOLOGY

## 2023-02-15 PROCEDURE — 999N000016 HC STATISTIC ATTENDANCE AT DELIVERY

## 2023-02-15 PROCEDURE — 3E033VJ INTRODUCTION OF OTHER HORMONE INTO PERIPHERAL VEIN, PERCUTANEOUS APPROACH: ICD-10-PCS | Performed by: OBSTETRICS & GYNECOLOGY

## 2023-02-15 PROCEDURE — 86780 TREPONEMA PALLIDUM: CPT | Performed by: OBSTETRICS & GYNECOLOGY

## 2023-02-15 PROCEDURE — 250N000013 HC RX MED GY IP 250 OP 250 PS 637: Performed by: OBSTETRICS & GYNECOLOGY

## 2023-02-15 PROCEDURE — 250N000009 HC RX 250: Performed by: OBSTETRICS & GYNECOLOGY

## 2023-02-15 PROCEDURE — 00HU33Z INSERTION OF INFUSION DEVICE INTO SPINAL CANAL, PERCUTANEOUS APPROACH: ICD-10-PCS | Performed by: ANESTHESIOLOGY

## 2023-02-15 PROCEDURE — 85025 COMPLETE CBC W/AUTO DIFF WBC: CPT | Performed by: OBSTETRICS & GYNECOLOGY

## 2023-02-15 PROCEDURE — 258N000003 HC RX IP 258 OP 636: Performed by: OBSTETRICS & GYNECOLOGY

## 2023-02-15 PROCEDURE — 10907ZC DRAINAGE OF AMNIOTIC FLUID, THERAPEUTIC FROM PRODUCTS OF CONCEPTION, VIA NATURAL OR ARTIFICIAL OPENING: ICD-10-PCS | Performed by: OBSTETRICS & GYNECOLOGY

## 2023-02-15 PROCEDURE — 3E0R3BZ INTRODUCTION OF ANESTHETIC AGENT INTO SPINAL CANAL, PERCUTANEOUS APPROACH: ICD-10-PCS | Performed by: ANESTHESIOLOGY

## 2023-02-15 PROCEDURE — 370N000003 HC ANESTHESIA WARD SERVICE

## 2023-02-15 PROCEDURE — 722N000001 HC LABOR CARE VAGINAL DELIVERY SINGLE

## 2023-02-15 PROCEDURE — 0KQM0ZZ REPAIR PERINEUM MUSCLE, OPEN APPROACH: ICD-10-PCS | Performed by: OBSTETRICS & GYNECOLOGY

## 2023-02-15 RX ORDER — METHYLERGONOVINE MALEATE 0.2 MG/ML
200 INJECTION INTRAVENOUS
Status: DISCONTINUED | OUTPATIENT
Start: 2023-02-15 | End: 2023-02-17 | Stop reason: HOSPADM

## 2023-02-15 RX ORDER — PROCHLORPERAZINE 25 MG
25 SUPPOSITORY, RECTAL RECTAL EVERY 12 HOURS PRN
Status: DISCONTINUED | OUTPATIENT
Start: 2023-02-15 | End: 2023-02-15 | Stop reason: HOSPADM

## 2023-02-15 RX ORDER — HYDROCORTISONE 25 MG/G
CREAM TOPICAL 3 TIMES DAILY PRN
Status: DISCONTINUED | OUTPATIENT
Start: 2023-02-15 | End: 2023-02-17 | Stop reason: HOSPADM

## 2023-02-15 RX ORDER — SODIUM CHLORIDE, SODIUM LACTATE, POTASSIUM CHLORIDE, CALCIUM CHLORIDE 600; 310; 30; 20 MG/100ML; MG/100ML; MG/100ML; MG/100ML
INJECTION, SOLUTION INTRAVENOUS CONTINUOUS PRN
Status: DISCONTINUED | OUTPATIENT
Start: 2023-02-15 | End: 2023-02-15 | Stop reason: HOSPADM

## 2023-02-15 RX ORDER — KETOROLAC TROMETHAMINE 30 MG/ML
30 INJECTION, SOLUTION INTRAMUSCULAR; INTRAVENOUS
Status: DISCONTINUED | OUTPATIENT
Start: 2023-02-15 | End: 2023-02-16

## 2023-02-15 RX ORDER — IBUPROFEN 400 MG/1
800 TABLET, FILM COATED ORAL
Status: DISCONTINUED | OUTPATIENT
Start: 2023-02-15 | End: 2023-02-16

## 2023-02-15 RX ORDER — ONDANSETRON 2 MG/ML
4 INJECTION INTRAMUSCULAR; INTRAVENOUS EVERY 6 HOURS PRN
Status: DISCONTINUED | OUTPATIENT
Start: 2023-02-15 | End: 2023-02-15 | Stop reason: HOSPADM

## 2023-02-15 RX ORDER — OXYTOCIN 10 [USP'U]/ML
10 INJECTION, SOLUTION INTRAMUSCULAR; INTRAVENOUS
Status: DISCONTINUED | OUTPATIENT
Start: 2023-02-15 | End: 2023-02-17 | Stop reason: HOSPADM

## 2023-02-15 RX ORDER — PROCHLORPERAZINE MALEATE 5 MG
10 TABLET ORAL EVERY 6 HOURS PRN
Status: DISCONTINUED | OUTPATIENT
Start: 2023-02-15 | End: 2023-02-15 | Stop reason: HOSPADM

## 2023-02-15 RX ORDER — LIDOCAINE 40 MG/G
CREAM TOPICAL
Status: DISCONTINUED | OUTPATIENT
Start: 2023-02-15 | End: 2023-02-15

## 2023-02-15 RX ORDER — OXYTOCIN/0.9 % SODIUM CHLORIDE 30/500 ML
1-24 PLASTIC BAG, INJECTION (ML) INTRAVENOUS CONTINUOUS
Status: DISCONTINUED | OUTPATIENT
Start: 2023-02-15 | End: 2023-02-15 | Stop reason: HOSPADM

## 2023-02-15 RX ORDER — ACETAMINOPHEN 325 MG/1
650 TABLET ORAL EVERY 4 HOURS PRN
Status: DISCONTINUED | OUTPATIENT
Start: 2023-02-15 | End: 2023-02-17 | Stop reason: HOSPADM

## 2023-02-15 RX ORDER — MISOPROSTOL 200 UG/1
400 TABLET ORAL
Status: DISCONTINUED | OUTPATIENT
Start: 2023-02-15 | End: 2023-02-15 | Stop reason: HOSPADM

## 2023-02-15 RX ORDER — MISOPROSTOL 200 UG/1
400 TABLET ORAL
Status: DISCONTINUED | OUTPATIENT
Start: 2023-02-15 | End: 2023-02-17 | Stop reason: HOSPADM

## 2023-02-15 RX ORDER — OXYTOCIN/0.9 % SODIUM CHLORIDE 30/500 ML
100-340 PLASTIC BAG, INJECTION (ML) INTRAVENOUS CONTINUOUS PRN
Status: DISCONTINUED | OUTPATIENT
Start: 2023-02-15 | End: 2023-02-16

## 2023-02-15 RX ORDER — NALBUPHINE HYDROCHLORIDE 10 MG/ML
2.5-5 INJECTION, SOLUTION INTRAMUSCULAR; INTRAVENOUS; SUBCUTANEOUS EVERY 6 HOURS PRN
Status: DISCONTINUED | OUTPATIENT
Start: 2023-02-15 | End: 2023-02-17 | Stop reason: HOSPADM

## 2023-02-15 RX ORDER — NALOXONE HYDROCHLORIDE 0.4 MG/ML
0.4 INJECTION, SOLUTION INTRAMUSCULAR; INTRAVENOUS; SUBCUTANEOUS
Status: DISCONTINUED | OUTPATIENT
Start: 2023-02-15 | End: 2023-02-15 | Stop reason: HOSPADM

## 2023-02-15 RX ORDER — ROPIVACAINE HYDROCHLORIDE 2 MG/ML
10 INJECTION, SOLUTION EPIDURAL; INFILTRATION; PERINEURAL ONCE
Status: DISCONTINUED | OUTPATIENT
Start: 2023-02-15 | End: 2023-02-15 | Stop reason: HOSPADM

## 2023-02-15 RX ORDER — OXYTOCIN/0.9 % SODIUM CHLORIDE 30/500 ML
340 PLASTIC BAG, INJECTION (ML) INTRAVENOUS CONTINUOUS PRN
Status: DISCONTINUED | OUTPATIENT
Start: 2023-02-15 | End: 2023-02-17 | Stop reason: HOSPADM

## 2023-02-15 RX ORDER — MISOPROSTOL 200 UG/1
800 TABLET ORAL
Status: DISCONTINUED | OUTPATIENT
Start: 2023-02-15 | End: 2023-02-17 | Stop reason: HOSPADM

## 2023-02-15 RX ORDER — DOCUSATE SODIUM 100 MG/1
100 CAPSULE, LIQUID FILLED ORAL DAILY
Status: DISCONTINUED | OUTPATIENT
Start: 2023-02-15 | End: 2023-02-17 | Stop reason: HOSPADM

## 2023-02-15 RX ORDER — ONDANSETRON 4 MG/1
4 TABLET, ORALLY DISINTEGRATING ORAL EVERY 6 HOURS PRN
Status: DISCONTINUED | OUTPATIENT
Start: 2023-02-15 | End: 2023-02-15 | Stop reason: HOSPADM

## 2023-02-15 RX ORDER — FENTANYL CITRATE 50 UG/ML
50 INJECTION, SOLUTION INTRAMUSCULAR; INTRAVENOUS EVERY 30 MIN PRN
Status: DISCONTINUED | OUTPATIENT
Start: 2023-02-15 | End: 2023-02-15 | Stop reason: HOSPADM

## 2023-02-15 RX ORDER — MISOPROSTOL 200 UG/1
800 TABLET ORAL
Status: DISCONTINUED | OUTPATIENT
Start: 2023-02-15 | End: 2023-02-15 | Stop reason: HOSPADM

## 2023-02-15 RX ORDER — OXYTOCIN/0.9 % SODIUM CHLORIDE 30/500 ML
340 PLASTIC BAG, INJECTION (ML) INTRAVENOUS CONTINUOUS PRN
Status: DISCONTINUED | OUTPATIENT
Start: 2023-02-15 | End: 2023-02-15 | Stop reason: HOSPADM

## 2023-02-15 RX ORDER — TRANEXAMIC ACID 10 MG/ML
1 INJECTION, SOLUTION INTRAVENOUS EVERY 30 MIN PRN
Status: DISCONTINUED | OUTPATIENT
Start: 2023-02-15 | End: 2023-02-17 | Stop reason: HOSPADM

## 2023-02-15 RX ORDER — LIDOCAINE 40 MG/G
CREAM TOPICAL
Status: DISCONTINUED | OUTPATIENT
Start: 2023-02-15 | End: 2023-02-15 | Stop reason: HOSPADM

## 2023-02-15 RX ORDER — IBUPROFEN 400 MG/1
800 TABLET, FILM COATED ORAL EVERY 6 HOURS PRN
Status: DISCONTINUED | OUTPATIENT
Start: 2023-02-15 | End: 2023-02-17 | Stop reason: HOSPADM

## 2023-02-15 RX ORDER — SODIUM CHLORIDE, SODIUM LACTATE, POTASSIUM CHLORIDE, CALCIUM CHLORIDE 600; 310; 30; 20 MG/100ML; MG/100ML; MG/100ML; MG/100ML
INJECTION, SOLUTION INTRAVENOUS CONTINUOUS
Status: DISCONTINUED | OUTPATIENT
Start: 2023-02-15 | End: 2023-02-15 | Stop reason: HOSPADM

## 2023-02-15 RX ORDER — METHYLERGONOVINE MALEATE 0.2 MG/ML
200 INJECTION INTRAVENOUS
Status: DISCONTINUED | OUTPATIENT
Start: 2023-02-15 | End: 2023-02-15 | Stop reason: HOSPADM

## 2023-02-15 RX ORDER — BISACODYL 10 MG
10 SUPPOSITORY, RECTAL RECTAL DAILY PRN
Status: DISCONTINUED | OUTPATIENT
Start: 2023-02-15 | End: 2023-02-17 | Stop reason: HOSPADM

## 2023-02-15 RX ORDER — MODIFIED LANOLIN
OINTMENT (GRAM) TOPICAL
Status: DISCONTINUED | OUTPATIENT
Start: 2023-02-15 | End: 2023-02-17 | Stop reason: HOSPADM

## 2023-02-15 RX ORDER — TRANEXAMIC ACID 10 MG/ML
1 INJECTION, SOLUTION INTRAVENOUS EVERY 30 MIN PRN
Status: DISCONTINUED | OUTPATIENT
Start: 2023-02-15 | End: 2023-02-15 | Stop reason: HOSPADM

## 2023-02-15 RX ORDER — CITRIC ACID/SODIUM CITRATE 334-500MG
30 SOLUTION, ORAL ORAL
Status: DISCONTINUED | OUTPATIENT
Start: 2023-02-15 | End: 2023-02-15 | Stop reason: HOSPADM

## 2023-02-15 RX ORDER — OXYTOCIN 10 [USP'U]/ML
10 INJECTION, SOLUTION INTRAMUSCULAR; INTRAVENOUS
Status: DISCONTINUED | OUTPATIENT
Start: 2023-02-15 | End: 2023-02-16

## 2023-02-15 RX ORDER — CARBOPROST TROMETHAMINE 250 UG/ML
250 INJECTION, SOLUTION INTRAMUSCULAR
Status: DISCONTINUED | OUTPATIENT
Start: 2023-02-15 | End: 2023-02-17 | Stop reason: HOSPADM

## 2023-02-15 RX ORDER — METOCLOPRAMIDE 10 MG/1
10 TABLET ORAL EVERY 6 HOURS PRN
Status: DISCONTINUED | OUTPATIENT
Start: 2023-02-15 | End: 2023-02-15 | Stop reason: HOSPADM

## 2023-02-15 RX ORDER — NALOXONE HYDROCHLORIDE 0.4 MG/ML
0.2 INJECTION, SOLUTION INTRAMUSCULAR; INTRAVENOUS; SUBCUTANEOUS
Status: DISCONTINUED | OUTPATIENT
Start: 2023-02-15 | End: 2023-02-15 | Stop reason: HOSPADM

## 2023-02-15 RX ORDER — EPHEDRINE SULFATE 50 MG/ML
5 INJECTION, SOLUTION INTRAMUSCULAR; INTRAVENOUS; SUBCUTANEOUS
Status: DISCONTINUED | OUTPATIENT
Start: 2023-02-15 | End: 2023-02-15 | Stop reason: HOSPADM

## 2023-02-15 RX ORDER — OXYTOCIN 10 [USP'U]/ML
10 INJECTION, SOLUTION INTRAMUSCULAR; INTRAVENOUS
Status: DISCONTINUED | OUTPATIENT
Start: 2023-02-15 | End: 2023-02-15 | Stop reason: HOSPADM

## 2023-02-15 RX ORDER — ROPIVACAINE HYDROCHLORIDE 2 MG/ML
INJECTION, SOLUTION EPIDURAL; INFILTRATION; PERINEURAL
Status: COMPLETED | OUTPATIENT
Start: 2023-02-15 | End: 2023-02-15

## 2023-02-15 RX ORDER — METOCLOPRAMIDE HYDROCHLORIDE 5 MG/ML
10 INJECTION INTRAMUSCULAR; INTRAVENOUS EVERY 6 HOURS PRN
Status: DISCONTINUED | OUTPATIENT
Start: 2023-02-15 | End: 2023-02-15 | Stop reason: HOSPADM

## 2023-02-15 RX ORDER — CARBOPROST TROMETHAMINE 250 UG/ML
250 INJECTION, SOLUTION INTRAMUSCULAR
Status: DISCONTINUED | OUTPATIENT
Start: 2023-02-15 | End: 2023-02-15 | Stop reason: HOSPADM

## 2023-02-15 RX ADMIN — SODIUM CHLORIDE, POTASSIUM CHLORIDE, SODIUM LACTATE AND CALCIUM CHLORIDE: 600; 310; 30; 20 INJECTION, SOLUTION INTRAVENOUS at 12:54

## 2023-02-15 RX ADMIN — Medication 100 ML/HR: at 17:34

## 2023-02-15 RX ADMIN — Medication 12 ML/HR: at 13:18

## 2023-02-15 RX ADMIN — ACETAMINOPHEN 650 MG: 325 TABLET, FILM COATED ORAL at 18:08

## 2023-02-15 RX ADMIN — SODIUM CHLORIDE, POTASSIUM CHLORIDE, SODIUM LACTATE AND CALCIUM CHLORIDE: 600; 310; 30; 20 INJECTION, SOLUTION INTRAVENOUS at 08:45

## 2023-02-15 RX ADMIN — Medication 2 MILLI-UNITS/MIN: at 10:05

## 2023-02-15 RX ADMIN — ROPIVACAINE HYDROCHLORIDE 10 ML: 2 INJECTION, SOLUTION EPIDURAL; INFILTRATION at 13:24

## 2023-02-15 RX ADMIN — SODIUM CHLORIDE, POTASSIUM CHLORIDE, SODIUM LACTATE AND CALCIUM CHLORIDE 1000 ML: 600; 310; 30; 20 INJECTION, SOLUTION INTRAVENOUS at 12:39

## 2023-02-15 RX ADMIN — Medication 340 ML/HR: at 16:24

## 2023-02-15 RX ADMIN — DOCUSATE SODIUM 100 MG: 100 CAPSULE, LIQUID FILLED ORAL at 18:08

## 2023-02-15 RX ADMIN — IBUPROFEN 800 MG: 400 TABLET ORAL at 18:09

## 2023-02-15 ASSESSMENT — ACTIVITIES OF DAILY LIVING (ADL)
ADLS_ACUITY_SCORE: 33

## 2023-02-15 NOTE — ANESTHESIA PROCEDURE NOTES
"Epidural catheter Procedure Note    Pre-Procedure   Staff -        Anesthesiologist:  Margarita Martinez       Performed By: anesthesiologist       Location: OB       Pre-Anesthestic Checklist: patient identified, IV checked, risks and benefits discussed, informed consent, monitors and equipment checked and pre-op evaluation  Timeout:       Correct Patient: Yes        Correct Procedure: Yes        Correct Site: Yes        Correct Position: Yes   Procedure Documentation  Procedure: epidural catheter       Patient Position: sitting       Patient Prep/Sterile Barriers: sterile gloves, mask, patient draped       Skin prep: Betadine       Local skin infiltrated with mL of 1% lidocaine.        Insertion Site: L3-4. (midline approach).       Technique: LORT saline        Needle Type: To"[employer]y needle       Needle Gauge: 17.        Needle Length (Inches): 3.5           Catheter threaded easily.         3.5 cm epidural space.           # of attempts: 1 and  # of redirects:  0    Assessment/Narrative         Paresthesias: No.       Test dose of 3 mL lidocaine 1.5% w/ 1:200,000 epinephrine at.         Test dose negative, 3 minutes after injection, for signs of intravascular, subdural, or intrathecal injection.       Insertion/Infusion Method: LORT saline       Aspiration negative for Heme or CSF via Epidural Catheter.    Medication(s) Administered   0.2% Ropivacaine (Epidural) - EPIDURAL   10 mL - 2/15/2023 1:24:00 PM   Comments:  No complications, patient tolerated the procedure well.  Sterile dressing placed.     After negative test dose, ropivacaine given in divided doses.      FOR [facility] ([facility]) ONLY:   Pain Team Contact information: please page the Pain Team Via [place]. Search \"Pain\". During daytime hours, please page the attending first. At night please page the resident first.    "

## 2023-02-15 NOTE — H&P
"  February 15, 2023  Valentine Clarke  [id number]      OB Admit History & Physical      Ms. Clarke  is here for scheduled elective induction of labor    She has noticed no recent painful ctx. No VB, LOF. Lost her mucous plug recently.    Patient's last menstrual period was 2022.   Her Estimated Date of Delivery: 2023, making her 39w1d  wks.      Estimated body mass index is 39.53 kg/m  as calculated from the following:    Height as of this encounter: 1.727 m (5' 8\").    Weight as of this encounter: 117.9 kg (260 lb).     Her prenatal course has been notable for:    -Anxiety, taking sertraline 50 mg daily    See prenatal for labs.  GBS neg, Rubella Immune, Rh positive    Estimated fetal weight= 8 lb 12 oz     Most recent growth US at 36 weeks: tranverse, EFW 73%ile, SABINA 18 cn    She is a 32 year old   Her OB history:   OB History    Para Term  AB Living   2 1 1 0 0 1   SAB IAB Ectopic Multiple Live Births   0 0 0 0 1      # Outcome Date GA Lbr [place]/2nd Weight Sex Delivery Anes PTL Lv   2 Current            1 Term 10/24/20 37w2d 02:14 / 01:27 3.62 kg (7 lb 15.7 oz) M Vag-Spont EPI N ANSON      Name: YUMIKO CLARKE-VALENTINE      Apgar1: 9  Apgar5: 9        Past Medical History:   Diagnosis Date     CARDIOVASCULAR SCREENING; LDL GOAL LESS THAN 160 10/31/2010     Chronic rhinitis 2009     Depression     post partum depression after first baby     Yeast vaginitis 2013        Past Surgical History:   Procedure Laterality Date     PE TUBES       TOOTH EXTRACTION      wisdom teeth removed         No current outpatient medications on file.       Allergies: Seasonal allergies      REVIEW OF SYSTEMS:  NEUROLOGIC:  Negative  EYES:  Negative  ENT:  Negative  GI:  Negative  BREAST:  Negative  :  Negative  GYN:  Negative  CV:  Negative  PULMONARY:  Negative  MUSCULOSKELETAL:  Negative  PSYCH:  Negative        Social History     Socioeconomic History     Marital status:      Spouse name: Not " on file     Number of children: Not on file     Years of education: Not on file     Highest education level: Not on file   Occupational History     Not on file   Tobacco Use     Smoking status: Never     Smokeless tobacco: Never   Substance and Sexual Activity     Alcohol use: Not Currently     Alcohol/week: 0.0 standard drinks     Comment: 1-2 a few times per week     Drug use: No     Sexual activity: Yes     Partners: Male     Birth control/protection: Pill   Other Topics Concern     Parent/sibling w/ CABG, MI or angioplasty before 65F 55M? No   Social History Narrative     Not on file     Social Determinants of Health     Financial Resource Strain: Not on file   Food Insecurity: Not on file   Transportation Needs: Not on file   Physical Activity: Not on file   Stress: Not on file   Social Connections: Not on file   Intimate Partner Violence: Not on file   Housing Stability: Not on file      Family History   Problem Relation Age of Onset     Family History Negative Mother      Family History Negative Father      Family History Negative Sister      Family History Negative Maternal Grandmother      Hypertension Maternal Grandmother      Unknown/Adopted Maternal Grandfather      Eye Disorder Paternal Grandmother         glaucoma     Diabetes Paternal Grandmother         ?     Hypertension Paternal Grandmother              Vitals:  Temp: 98.8  F (37.1  C) Temp src: Oral BP: 116/69 Pulse: 93   Resp: 16        Gen: NAD  Abd: Soft, NT, gravid  Ext: NT/NE  FHT: 155/mod/+accels/no decels  [place]: irritable  SVE: 2-3/50/-2, vertex, AROM with scant clear fluid    A/P: 33 y/o  at 39w1d presenting for elective IOL    Cat 1 FHT    IOL - s/p AROM now  Start pitocin    Rh+/RI/GBS neg    Planning epidural for pain management    Admit to L&D    Tonya Grijalva MD  Dept of OB/GYN  February 15, 2023

## 2023-02-15 NOTE — L&D DELIVERY NOTE
OB Vaginal Delivery Note    Courtney Clarke MRN# [id number]   Age: 32 year old YOB: 1990       GA: 39w1d  GP:   Labor Complications: None   EBL: 125  mL  Delivery QBL:    Delivery Type: Vaginal, Spontaneous   ROM to Delivery Time: (Delivered) Hours: 7 Minutes: 59   Weight:     1 Minute 5 Minute 10 Minute   Apgar Totals: 9   9             Delivery Details:  Courtney Clarke, a 32 year old  female delivered a viable infant with apgars of 9  and 9 . Patient was fully dilated and pushing after   hours   minutes in active labor. She pushed for approximately 7 minutes. Delivery was via vaginal, spontaneous  to a sterile field under  epidural anesthesia. Infant delivered in vertex  right  occiput  anterior  position. Anterior and posterior shoulders delivered without difficulty. The cord was clamped after 1 minute of delayed cord clamping, cut twice and 3 vessels  were noted. [place] weight is pending. Cord blood was obtained in routine fashion with the following disposition: discard .      Cord complications: none   Placenta delivered at 2/15/2023  4:24 PM . Placental disposition was Hospital disposal . Fundal massage performed and fundus found to be firm.     Episiotomy: none    Perineum, vagina, cervix were inspected, and the following lacerations were noted:   Perineal lacerations: 2nd                Any lacerations were repaired in the usual fashion using 3-0 vicryl.    Excellent hemostasis was noted. Needle count correct. Infant and patient in delivery room in good and stable condition.        Jonas Clarke-Courtney [[id number]]    Labor Event Times    Labor onset date: 2/15/23 Onset time: 12:30 PM   Dilation complete date: 2/15/23 Complete time:  4:12 PM   Start pushing date/time: 2/15/2023 1612      Labor Events     labor?: No   steroids: None     Rupture date/time: 2/15/23 0820   Rupture type: Artificial Rupture of Membranes  Fluid color: Clear, Pink  Fluid odor:  Normal     Induction: AROM, Oxytocin  Induction date/time:     Cervical ripening date/time:     Indications for induction: Elective     Delivery/Placenta Date and Time    Delivery Date: 2/15/23 Delivery Time:  4:19 PM   Placenta Date/Time: 2/15/2023  4:24 PM          Apgars    Living status: Living   1 Minute 5 Minute 10 Minute 15 Minute 20 Minute   Skin color: 1  1       Heart rate: 2  2       Reflex irritability: 2  2       Muscle tone: 2  2       Respiratory effort: 2  2       Total: 9  9       Apgars assigned by: ANDRA MCGOWAN RN     Cord    Vessels: 3 Vessels    Cord Complications: None               Cord Blood Disposition: Discard    Gases Sent?: No    Delayed cord clamping?: Yes    Cord Clamping Delay (seconds):  seconds        Resuscitation    Methods: None     Skin to Skin and Feeding Plan    Skin to skin initiation date/time: 1841    Skin to skin with: Mother  Skin to skin end date/time:        Labor Events and Shoulder Dystocia    Fetal Tracing Prior to Delivery: Category 2  Shoulder dystocia present?: Neg     Delivery (Maternal) (Provider to Complete) (699499)    Episiotomy: None  Perineal lacerations: 2nd    Est. blood loss (mL): 125  Repair suture: 3-0 Vicryl  Number of repair packets: 1  Genital tract inspection done: Pos     Blood Loss  Mother: Courtney Clarke #[id number]   Start of Mother's Information    Delivery Blood Loss  02/15/23 1230 - 02/15/23 1640    EBL (mL) Hospital Encounter 125 mL    Total  125 mL         End of Mother's Information  Mother: Courtney Clarke #[id number]          Delivery - Provider to Complete (950200)    Attempted Delivery Types (Choose all that apply): Spontaneous Vaginal Delivery  Delivery Type (Choose the 1 that will go to the Birth History): Vaginal, Spontaneous                                 Placenta    Date/Time: 2/15/2023  4:24 PM  Removal: Spontaneous  Disposition: Hospital disposal           Presentation and Position    Presentation:  Vertex    Position: Right Occiput Anterior                 Tonya Grijalva MD

## 2023-02-15 NOTE — ANESTHESIA PREPROCEDURE EVALUATION
Anesthesia Pre-Procedure Evaluation    Patient: Courtney Clarke   MRN: [id number] : 1990        Procedure :           Past Medical History:   Diagnosis Date     CARDIOVASCULAR SCREENING; LDL GOAL LESS THAN 160 10/31/2010     Chronic rhinitis 2009     Depression     post partum depression after first baby     Yeast vaginitis 2013      Past Surgical History:   Procedure Laterality Date     PE TUBES       TOOTH EXTRACTION      wisdom teeth removed      Allergies   Allergen Reactions     Seasonal Allergies       Social History     Tobacco Use     Smoking status: Never     Smokeless tobacco: Never   Substance Use Topics     Alcohol use: Not Currently     Alcohol/week: 0.0 standard drinks     Comment: 1-2 a few times per week      Wt Readings from Last 1 Encounters:   02/15/23 117.9 kg (260 lb)        Anesthesia Evaluation            ROS/MED HX  ENT/Pulmonary:    (-) asthma   Neurologic:  - neg neurologic ROS     Cardiovascular:    (-) PIH   METS/Exercise Tolerance:     Hematologic:     (+) no anticoagulation therapy, no coagulopathy,     Musculoskeletal:       GI/Hepatic:     (+) GERD,     Renal/Genitourinary:       Endo:       Psychiatric/Substance Use:       Infectious Disease:       Malignancy:       Other:            Physical Exam    Airway        Mallampati: II   TM distance: > 3 FB   Neck ROM: full     Respiratory Devices and Support         Dental  no notable dental history         Cardiovascular   cardiovascular exam normal          Pulmonary   pulmonary exam normal                OUTSIDE LABS:  CBC:   Lab Results   Component Value Date    WBC 6.8 02/15/2023    WBC 11.9 (H) 10/24/2020    HGB 12.4 02/15/2023    HGB 13.3 10/24/2020    HCT 35.3 02/15/2023    HCT 38.1 10/24/2020     02/15/2023     10/24/2020     BMP:   Lab Results   Component Value Date    GLC 94 2018    GLC 98 2017     COAGS: No results found for: PTT, INR, FIBR  POC: No results found for: BGM, HCG,  HCGS  HEPATIC: No results found for: ALBUMIN, PROTTOTAL, ALT, AST, GGT, ALKPHOS, BILITOTAL, BILIDIRECT, JOANN  OTHER: No results found for: PH, LACT, A1C, WEI, PHOS, MAG, LIPASE, AMYLASE, TSH, T4, T3, CRP, SED    Anesthesia Plan    ASA Status:  2      Anesthesia Type: Epidural.              Consents    Anesthesia Plan(s) and associated risks, benefits, and realistic alternatives discussed. Questions answered and patient/representative(s) expressed understanding.    - Discussed:     - Discussed with:  Patient         Postoperative Care            Comments:    Other Comments: Orders to manage the epidural infusion have been entered, and through coordination with the nurse, we will continute to manage and monitor the patient's labor epidural.  We will continuously be available to adjust as needed thruout the entire L&D process.             Margarita Martinez

## 2023-02-15 NOTE — PLAN OF CARE
Pt of Dr Grijalva admitted to 215 for EIOL. Pt accompanied by spouse, Miguel. US and TOCO applied. Pt states she is GBS negative. Pt blood type is A +. Pt delivered her first baby spontaneously at 37 wks.   Dr Grijalva AROM @ 0820, clear fluid noted. Pt may have epidural when she wants it. Pt has a history of postpartum depression with her first child, already started on Zoloft 50 mg daily.

## 2023-02-16 PROCEDURE — 120N000012 HC R&B POSTPARTUM

## 2023-02-16 PROCEDURE — 250N000013 HC RX MED GY IP 250 OP 250 PS 637: Performed by: OBSTETRICS & GYNECOLOGY

## 2023-02-16 RX ADMIN — ACETAMINOPHEN 650 MG: 325 TABLET, FILM COATED ORAL at 13:40

## 2023-02-16 RX ADMIN — ACETAMINOPHEN 650 MG: 325 TABLET, FILM COATED ORAL at 07:46

## 2023-02-16 RX ADMIN — DOCUSATE SODIUM 100 MG: 100 CAPSULE, LIQUID FILLED ORAL at 07:46

## 2023-02-16 RX ADMIN — ACETAMINOPHEN 650 MG: 325 TABLET, FILM COATED ORAL at 00:25

## 2023-02-16 RX ADMIN — IBUPROFEN 800 MG: 400 TABLET ORAL at 07:47

## 2023-02-16 RX ADMIN — IBUPROFEN 800 MG: 400 TABLET ORAL at 19:35

## 2023-02-16 RX ADMIN — ACETAMINOPHEN 650 MG: 325 TABLET, FILM COATED ORAL at 19:35

## 2023-02-16 RX ADMIN — IBUPROFEN 800 MG: 400 TABLET ORAL at 13:39

## 2023-02-16 RX ADMIN — SERTRALINE HYDROCHLORIDE 50 MG: 50 TABLET ORAL at 07:47

## 2023-02-16 RX ADMIN — IBUPROFEN 800 MG: 400 TABLET ORAL at 00:25

## 2023-02-16 ASSESSMENT — ACTIVITIES OF DAILY LIVING (ADL)
ADLS_ACUITY_SCORE: 33
ADLS_ACUITY_SCORE: 33
TOILETING_ISSUES: NO
FALL_HISTORY_WITHIN_LAST_SIX_MONTHS: NO
ADLS_ACUITY_SCORE: 33
ADLS_ACUITY_SCORE: 33
CONCENTRATING,_REMEMBERING_OR_MAKING_DECISIONS_DIFFICULTY: NO
DIFFICULTY_EATING/SWALLOWING: NO
ADLS_ACUITY_SCORE: 33
ADLS_ACUITY_SCORE: 33
DRESSING/BATHING_DIFFICULTY: NO
VISION_MANAGEMENT: GLASSES
ADLS_ACUITY_SCORE: 20
DOING_ERRANDS_INDEPENDENTLY_DIFFICULTY: NO
ADLS_ACUITY_SCORE: 33
CHANGE_IN_FUNCTIONAL_STATUS_SINCE_ONSET_OF_CURRENT_ILLNESS/INJURY: NO
WEAR_GLASSES_OR_BLIND: YES
WALKING_OR_CLIMBING_STAIRS_DIFFICULTY: NO
ADLS_ACUITY_SCORE: 20

## 2023-02-16 NOTE — ANESTHESIA POSTPROCEDURE EVALUATION
Patient: Courtney Clarke    Procedure: * No procedures listed *       Anesthesia Type:  Epidural    Note:     Postop Pain Control: Uneventful            Sign Out: Well controlled pain   PONV: No   Neuro/Psych: Uneventful            Sign Out: Acceptable/Baseline neuro status   Airway/Respiratory: Uneventful            Sign Out: Acceptable/Baseline resp. status   CV/Hemodynamics: Uneventful            Sign Out: Acceptable CV status; No obvious hypovolemia; No obvious fluid overload   Other NRE: NONE   DID A NON-ROUTINE EVENT OCCUR? No           Last vitals:  Vitals:    02/16/23 0025 02/16/23 0318 02/16/23 0746   BP: 110/70 135/74 122/74   Pulse: 82 79    Resp: 17 20 18   Temp: 36.9  C (98.4  F) 36.9  C (98.4  F) 36.8  C (98.2  F)   SpO2:          Electronically Signed By: Aristides Ellsworth MD  February 16, 2023  4:26 PM

## 2023-02-16 NOTE — PROGRESS NOTES
"PPD #1    Doing well  No concerns    /74 (BP Location: Left arm, Patient Position: Sitting, Cuff Size: Adult Regular)   Pulse 79   Temp 98.2  F (36.8  C) (Oral)   Resp 18   Ht 1.727 m (5' 8\")   Wt 117.9 kg (260 lb)   LMP 2022   SpO2 99%   Breastfeeding Unknown   BMI 39.53 kg/m      A/P PPD 1 s/p     Routine postpartum care    Discharge home tomorrow    Jennifer L. Schwab, MD    "

## 2023-02-16 NOTE — PLAN OF CARE
Data: Courtney Clarke transferred to [facility] via wheelchair at 1940. Baby transferred via parent's arms.  Action: Receiving unit notified of transfer: Yes. Patient and family notified of room change. Report given to Patsy ESCALANTE at bedside. Belongings sent to receiving unit. Accompanied by Registered Nurse. Oriented patient to surroundings. Call light within reach. ID bands double-checked with receiving RN.  Response: Patient tolerated transfer and is stable.

## 2023-02-16 NOTE — PLAN OF CARE
Assumed care on 2-15 @ 2300. PT VSS, fundus/bleediing WNL. Encouraged need for emotional suppport. Handoff complete at 0700

## 2023-02-16 NOTE — PLAN OF CARE
Pt. admitted from L&D  via wheel chair and transferred to bed with assistance. Pt. arrived with baby and was accompanied by  and arrived with personal belongings. Report was taken from Melissa Durham in L&D. Vital signs are stable.  Fundus is firm and midline.  Vaginal bleeding is scant.  Ambulates independently.  Voiding and emptying her bladder.  Pt. oriented to the room and call light system. Educational folder has been introduced to patient.  Continue to monitor.

## 2023-02-17 VITALS
HEART RATE: 69 BPM | RESPIRATION RATE: 16 BRPM | WEIGHT: 260 LBS | DIASTOLIC BLOOD PRESSURE: 74 MMHG | OXYGEN SATURATION: 99 % | BODY MASS INDEX: 39.4 KG/M2 | TEMPERATURE: 97.9 F | HEIGHT: 68 IN | SYSTOLIC BLOOD PRESSURE: 124 MMHG

## 2023-02-17 PROCEDURE — 250N000013 HC RX MED GY IP 250 OP 250 PS 637: Performed by: OBSTETRICS & GYNECOLOGY

## 2023-02-17 RX ORDER — IBUPROFEN 800 MG/1
800 TABLET, FILM COATED ORAL EVERY 6 HOURS PRN
Qty: 90 TABLET | Refills: 0 | Status: SHIPPED | OUTPATIENT
Start: 2023-02-17

## 2023-02-17 RX ORDER — ACETAMINOPHEN 325 MG/1
650 TABLET ORAL EVERY 4 HOURS PRN
Qty: 90 TABLET | Refills: 0 | Status: SHIPPED | OUTPATIENT
Start: 2023-02-17

## 2023-02-17 RX ORDER — MODIFIED LANOLIN
OINTMENT (GRAM) TOPICAL
Qty: 40 G | Refills: 0 | Status: SHIPPED | OUTPATIENT
Start: 2023-02-17

## 2023-02-17 RX ADMIN — ACETAMINOPHEN 650 MG: 325 TABLET, FILM COATED ORAL at 03:59

## 2023-02-17 RX ADMIN — SERTRALINE HYDROCHLORIDE 50 MG: 50 TABLET ORAL at 07:46

## 2023-02-17 RX ADMIN — ACETAMINOPHEN 650 MG: 325 TABLET, FILM COATED ORAL at 10:05

## 2023-02-17 RX ADMIN — IBUPROFEN 800 MG: 400 TABLET ORAL at 04:00

## 2023-02-17 RX ADMIN — IBUPROFEN 800 MG: 400 TABLET ORAL at 10:05

## 2023-02-17 RX ADMIN — DOCUSATE SODIUM 100 MG: 100 CAPSULE, LIQUID FILLED ORAL at 07:46

## 2023-02-17 ASSESSMENT — ACTIVITIES OF DAILY LIVING (ADL)
ADLS_ACUITY_SCORE: 20

## 2023-02-17 NOTE — PROGRESS NOTES
VSS. Voiding without difficulty. Independent with cares. Pain controlled with tylenol and ibuprofen. Denies needs at this time. Will continue to monitor.

## 2023-02-17 NOTE — DISCHARGE SUMMARY
[facility]    Discharge Summary  Obstetrics    Date of Admission:  2/15/2023  Date of Discharge:  2/17/2023  Discharging Provider: Jodi Koehler MD, MD  Date of Service (when I saw the patient): 02/17/23    Discharge Diagnoses   * No surgery found *    History of Present Illness   Courtney Clarke is a 32 year old female who presented with plan for Induction of labor.     Hospital Course   The patient's hospital course was unremarkable.  She recovered as anticipated and experienced no post-delivery complications.  On discharge, her pain was well controlled. Vaginal bleeding is similar to peak menstrual flow.  Voiding without difficulty.  Ambulating well and tolerating a normal diet.  No fevers.  Breastfeeding well.  Infant is stable.  She was discharged on post-partum day #2.    Post-partum hemoglobin:   Hemoglobin   Date Value Ref Range Status   02/15/2023 12.4 11.7 - 15.7 g/dL Final   10/24/2020 13.3 11.7 - 15.7 g/dL Final         Discharge Disposition   Discharged to home   Condition at discharge: Good    Primary Care Physician   Physician No Ref-Primary    Consultations This Hospital Stay   ANESTHESIOLOGY IP CONSULT  LACTATION IP CONSULT    Discharge Orders      Postpartum Home Care Referral      Activity    Activity as tolerated     Reason for your hospital stay    Maternity care     Follow Up    Virtual visit with Nurse Practitioner 2 weeks PP.  In person visit with MD 8 weeks PP.     Breast pump - Manual/Electric    Breast Pump Documentation:  Manual/Electric Pump: To support adequate breast milk production and nutrition for infant.     I, the undersigned, certify that the above prescribed supplies are medically necessary for this patient and is both reasonable and necessary in reference to accepted standards of medical and necessary in reference to accepted standards of medical practice in the treatment of this patient's condition and is not prescribed as a convenience.     Diet     Resume previous diet     Discharge Medications   Current Discharge Medication List      START taking these medications    Details   acetaminophen (TYLENOL) 325 MG tablet Take 2 tablets (650 mg) by mouth every 4 hours as needed for mild pain or fever (greater than or equal to 38  C /100.4  F (oral) or 38.5  C/ 101.4  F (core).)  Qty: 90 tablet, Refills: 0    Associated Diagnoses: Obstetrical laceration, second degree; Normal vaginal delivery; 39 weeks gestation of pregnancy      ibuprofen (ADVIL/MOTRIN) 800 MG tablet Take 1 tablet (800 mg) by mouth every 6 hours as needed for other (cramping)  Qty: 90 tablet, Refills: 0    Associated Diagnoses: Obstetrical laceration, second degree; Normal vaginal delivery; 39 weeks gestation of pregnancy      lanolin ointment Apply topically every hour as needed for other (sore nipples)  Qty: 40 g, Refills: 0    Associated Diagnoses: Obstetrical laceration, second degree; Normal vaginal delivery; 39 weeks gestation of pregnancy         CONTINUE these medications which have NOT CHANGED    Details   Prenatal Vit-Fe Fumarate-FA (PRENATAL MULTIVITAMIN W/IRON) 27-0.8 MG tablet Take 1 tablet by mouth daily      sertraline (ZOLOFT) 25 MG tablet Take 50 mg by mouth daily      fluticasone (FLONASE) 50 MCG/ACT nasal spray Spray 1 spray into both nostrils daily  Qty: 9.9 mL, Refills: 0    Associated Diagnoses: Post-nasal drainage         STOP taking these medications       aspirin 81 MG EC tablet Comments:   Reason for Stopping:             Allergies   Allergies   Allergen Reactions     Seasonal Allergies

## 2023-02-17 NOTE — PLAN OF CARE
Patient discharged home with spouse per orders. Discharge instructions reviewed with patient, patient states understanding, paperwork signed. Discharge Rx papers given to patient to be filled elsewhere if needed. No further questions or concerns.

## 2023-02-17 NOTE — PLAN OF CARE
Patient taking Tylenol and Ibuprofen for mild discomfort with adequate pain relief. Patient ambulating independently, voiding without difficulty. Patient ready for discharge per order, all education complete/questions answered. Encouraged patient to call with needs/questions. Call light within reach, will continue to monitor until discharge.

## 2023-02-17 NOTE — PROGRESS NOTES
"S: Pt doing well. Pt is  feeding. Pain is well controlled.    O: /62   Pulse 80   Temp 97.7  F (36.5  C) (Oral)   Resp 16   Ht 1.727 m (5' 8\")   Wt 117.9 kg (260 lb)   LMP 05/17/2022   SpO2 99%   Breastfeeding Unknown   BMI 39.53 kg/m          ABD:  Uterine fundus is firm, non-tender and at the level of the umbilicus                Hemoglobin   Date Value Ref Range Status   02/15/2023 12.4 11.7 - 15.7 g/dL Final   10/24/2020 13.3 11.7 - 15.7 g/dL Final            Lab Results   Component Value Date    RH Pos 10/24/2020          No results found for: RUBELLATITER    A: Post-partum day #2 s/p Induced vaginal delivery secondary to maternal request    P: Continue PP Cares, Discharge Home  Jodi Koehler MD, MD on 2/17/2023 at 7:32 AM        "

## 2023-02-17 NOTE — LACTATION NOTE
"Lactation visit with Courtney, FOB, and baby boy.    Courtney shares her first baby didn't latch well and she ended up pumping and bottling. Courtney was really hoping breastfeeding would go better this time around. Infant was nursing in cross cradle at time of visit. Infant has wide latch with observed nutritive suckling pattern. Per Courtney he had already been nursing for about an hour. LC discussed cluster-feeding behavior (infant just over 24 hours old.) Suggested if infant is beginning to clusterfeed, switch breasts about every 30 minutes or so to \"share the love\".  Suggested parents read the paragraph in the [employer] about cluster-feeding and typical 2nd-3rd day infant feeding behavior.     Courtney is very natural with infant, holding/latching his with ease. Her nipples are tender but overall she is doing quite well so far. Suggested there is no reason for Courtney to begin pumping right away if infant continues to breast feed well.     Reviewed  breastfeeding basics:   1) Watch for early feeding cues (licking lips, stirring or rooting, sucking movement with mouth, hands to mouth).  2) Infant should breastfeed on demand and a minimum of 8 times in 24 hours. Offer to  breastfeed infant at least every 3 hours.     Reviewed breast feeding section in our \"Guide to Postpartum and  Care.\" Highlighting page that educates to  feeding patterns/behavior. Day one \"normal sleepiness\" followed by a cluster feeding pattern on second day/night. Also reviewed feeding log in back of booklet, how to track and why tracking infant's feedings and wet/dirty diapers is important. Provided Micah suggestions for tracking beyond day 5.     Discussed physiology of milk production from colostrum through milk \"coming in\". Typically women begin to feel changes to their breasts between day 3-5.  Appreciative of visit.    Elizabeth Perez RN, IBCLC            "

## 2023-02-17 NOTE — PLAN OF CARE
Patient states perineal pain controlled well with PO meds. Voiding without difficulty and encouraged frequent voiding. Patient independent with cares. Encouraged her to call for assistance as needed. Patient verbalized understanding.

## 2023-03-26 ENCOUNTER — HEALTH MAINTENANCE LETTER (OUTPATIENT)
Age: 33
End: 2023-03-26

## 2024-05-26 ENCOUNTER — HEALTH MAINTENANCE LETTER (OUTPATIENT)
Age: 34
End: 2024-05-26

## 2025-06-14 ENCOUNTER — HEALTH MAINTENANCE LETTER (OUTPATIENT)
Age: 35
End: 2025-06-14